# Patient Record
Sex: FEMALE | Race: BLACK OR AFRICAN AMERICAN | Employment: OTHER | URBAN - METROPOLITAN AREA
[De-identification: names, ages, dates, MRNs, and addresses within clinical notes are randomized per-mention and may not be internally consistent; named-entity substitution may affect disease eponyms.]

---

## 2019-09-05 ENCOUNTER — OFFICE VISIT (OUTPATIENT)
Dept: FAMILY MEDICINE CLINIC | Age: 79
End: 2019-09-05

## 2019-09-05 VITALS
HEIGHT: 65 IN | DIASTOLIC BLOOD PRESSURE: 84 MMHG | BODY MASS INDEX: 29.39 KG/M2 | WEIGHT: 176.4 LBS | OXYGEN SATURATION: 94 % | RESPIRATION RATE: 16 BRPM | HEART RATE: 65 BPM | TEMPERATURE: 97.8 F | SYSTOLIC BLOOD PRESSURE: 170 MMHG

## 2019-09-05 DIAGNOSIS — Z78.0 POST-MENOPAUSAL: ICD-10-CM

## 2019-09-05 DIAGNOSIS — E78.00 PURE HYPERCHOLESTEROLEMIA: ICD-10-CM

## 2019-09-05 DIAGNOSIS — I10 ESSENTIAL HYPERTENSION: Primary | ICD-10-CM

## 2019-09-05 RX ORDER — AMLODIPINE BESYLATE 5 MG/1
5 TABLET ORAL DAILY
COMMUNITY
End: 2019-09-05 | Stop reason: SDUPTHER

## 2019-09-05 RX ORDER — ENALAPRIL MALEATE 20 MG/1
20 TABLET ORAL DAILY
COMMUNITY
End: 2019-09-05 | Stop reason: SDUPTHER

## 2019-09-05 RX ORDER — CHOLECALCIFEROL TAB 125 MCG (5000 UNIT) 125 MCG
5000 TAB ORAL DAILY
COMMUNITY

## 2019-09-05 RX ORDER — ENALAPRIL MALEATE 20 MG/1
20 TABLET ORAL DAILY
Qty: 90 TAB | Refills: 0 | Status: SHIPPED | OUTPATIENT
Start: 2019-09-05 | End: 2019-10-22 | Stop reason: SDUPTHER

## 2019-09-05 RX ORDER — AMLODIPINE BESYLATE 5 MG/1
5 TABLET ORAL DAILY
Qty: 90 TAB | Refills: 0 | Status: SHIPPED | OUTPATIENT
Start: 2019-09-05 | End: 2020-01-08 | Stop reason: SDUPTHER

## 2019-09-05 RX ORDER — BISMUTH SUBSALICYLATE 262 MG
1 TABLET,CHEWABLE ORAL DAILY
COMMUNITY

## 2019-09-05 NOTE — PATIENT INSTRUCTIONS
DASH Diet: Care Instructions  Your Care Instructions    The DASH diet is an eating plan that can help lower your blood pressure. DASH stands for Dietary Approaches to Stop Hypertension. Hypertension is high blood pressure. The DASH diet focuses on eating foods that are high in calcium, potassium, and magnesium. These nutrients can lower blood pressure. The foods that are highest in these nutrients are fruits, vegetables, low-fat dairy products, nuts, seeds, and legumes. But taking calcium, potassium, and magnesium supplements instead of eating foods that are high in those nutrients does not have the same effect. The DASH diet also includes whole grains, fish, and poultry. The DASH diet is one of several lifestyle changes your doctor may recommend to lower your high blood pressure. Your doctor may also want you to decrease the amount of sodium in your diet. Lowering sodium while following the DASH diet can lower blood pressure even further than just the DASH diet alone. Follow-up care is a key part of your treatment and safety. Be sure to make and go to all appointments, and call your doctor if you are having problems. It's also a good idea to know your test results and keep a list of the medicines you take. How can you care for yourself at home? Following the DASH diet  · Eat 4 to 5 servings of fruit each day. A serving is 1 medium-sized piece of fruit, ½ cup chopped or canned fruit, 1/4 cup dried fruit, or 4 ounces (½ cup) of fruit juice. Choose fruit more often than fruit juice. · Eat 4 to 5 servings of vegetables each day. A serving is 1 cup of lettuce or raw leafy vegetables, ½ cup of chopped or cooked vegetables, or 4 ounces (½ cup) of vegetable juice. Choose vegetables more often than vegetable juice. · Get 2 to 3 servings of low-fat and fat-free dairy each day. A serving is 8 ounces of milk, 1 cup of yogurt, or 1 ½ ounces of cheese. · Eat 6 to 8 servings of grains each day.  A serving is 1 slice of bread, 1 ounce of dry cereal, or ½ cup of cooked rice, pasta, or cooked cereal. Try to choose whole-grain products as much as possible. · Limit lean meat, poultry, and fish to 2 servings each day. A serving is 3 ounces, about the size of a deck of cards. · Eat 4 to 5 servings of nuts, seeds, and legumes (cooked dried beans, lentils, and split peas) each week. A serving is 1/3 cup of nuts, 2 tablespoons of seeds, or ½ cup of cooked beans or peas. · Limit fats and oils to 2 to 3 servings each day. A serving is 1 teaspoon of vegetable oil or 2 tablespoons of salad dressing. · Limit sweets and added sugars to 5 servings or less a week. A serving is 1 tablespoon jelly or jam, ½ cup sorbet, or 1 cup of lemonade. · Eat less than 2,300 milligrams (mg) of sodium a day. If you limit your sodium to 1,500 mg a day, you can lower your blood pressure even more. Tips for success  · Start small. Do not try to make dramatic changes to your diet all at once. You might feel that you are missing out on your favorite foods and then be more likely to not follow the plan. Make small changes, and stick with them. Once those changes become habit, add a few more changes. · Try some of the following:  ? Make it a goal to eat a fruit or vegetable at every meal and at snacks. This will make it easy to get the recommended amount of fruits and vegetables each day. ? Try yogurt topped with fruit and nuts for a snack or healthy dessert. ? Add lettuce, tomato, cucumber, and onion to sandwiches. ? Combine a ready-made pizza crust with low-fat mozzarella cheese and lots of vegetable toppings. Try using tomatoes, squash, spinach, broccoli, carrots, cauliflower, and onions. ? Have a variety of cut-up vegetables with a low-fat dip as an appetizer instead of chips and dip. ? Sprinkle sunflower seeds or chopped almonds over salads. Or try adding chopped walnuts or almonds to cooked vegetables.   ? Try some vegetarian meals using beans and peas. Add garbanzo or kidney beans to salads. Make burritos and tacos with mashed rockwell beans or black beans. Where can you learn more? Go to http://obinna-yoselin.info/. Enter U607 in the search box to learn more about \"DASH Diet: Care Instructions. \"  Current as of: July 22, 2018  Content Version: 12.1  © 6435-4525 [employer], [employer]. Care instructions adapted under license by [employer] (which disclaims liability or warranty for this information). If you have questions about a medical condition or this instruction, always ask your healthcare professional. [address] any warranty or liability for your use of this information.

## 2019-09-05 NOTE — PROGRESS NOTES
1. Have you been to the ER, urgent care clinic since your last visit? Hospitalized since your last visit? No    2. Have you seen or consulted any other health care providers outside of the [address] [place] [address] since your last visit? Include any pap smears or colon screening. No    Chief Complaint   Patient presents with    New Patient    Hypertension    Other     balance problems due to eye condition - previous bilat cataract extraction with lens implant    Medication Refill    Cholesterol Problem       Patient stated she was not taking blood pressure medications daily for the last month - concerned about running out of medication.

## 2019-09-05 NOTE — PROGRESS NOTES
Freida Severe, [de-identified] y.o.,  female    [address] [facility] [place], relocated from [place]    HTN- long standing h/o. On enalapril and norvasc. She says usually doctor visit checks /80's. 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 ROS:  See HPI, all others negative        There is no problem list on file for this patient. Current Outpatient Medications   Medication Sig Dispense Refill    multivitamin (ONE A DAY) tablet Take 1 Tab by mouth daily.  cholecalciferol, VITAMIN D3, (VITAMIN D3) 5,000 unit tab tablet Take 5,000 Units by mouth daily.  amLODIPine (NORVASC) 5 mg tablet Take 1 Tab by mouth daily. 90 Tab 0    enalapril (VASOTEC) 20 mg tablet Take 1 Tab by mouth daily.  90 Tab 0       No Known Allergies    Past Medical History:   Diagnosis Date    High blood pressure     High cholesterol     Migraine        Social History     Socioeconomic History    Marital status:      Spouse name: Not on file    Number of children: Not on file    Years of education: Not on file    Highest education level: Not on file   Occupational History    Not on file   Social Needs    Financial resource strain: Not on file    Food insecurity:     Worry: Not on file     Inability: Not on file    Transportation needs:     Medical: Not on file     Non-medical: Not on file   Tobacco Use    Smoking status: Former Smoker     Types: Cigarettes     Last attempt to quit: 1979     Years since quittin.7    Smokeless tobacco: Never Used   Substance and Sexual Activity    Alcohol use: Yes     Frequency: Monthly or less     Drinks per session: 1 or 2     Binge frequency: Never    Drug use: Never    Sexual activity: Not Currently     Partners: Male     Birth control/protection: Surgical     Comment: tubal ligation   Lifestyle  Physical activity:     Days per week: Not on file     Minutes per session: Not on file    Stress: Not on file   Relationships    Social connections:     Talks on phone: Not on file     Gets together: Not on file     Attends Christianity service: Not on file     Active member of club or organization: Not on file     Attends meetings of clubs or organizations: Not on file     Relationship status: Not on file    Intimate partner violence:     Fear of current or ex partner: Not on file     Emotionally abused: Not on file     Physically abused: Not on file     Forced sexual activity: Not on file   Other Topics Concern    Not on file   Social History Narrative    Not on file       Family History   Problem Relation Age of Onset    Cancer Mother         leukemia    Stroke Maternal Grandmother     Hypertension Maternal Grandmother     Substance Abuse Maternal Uncle         tobacco use    Cancer Maternal Uncle         leukemia         OBJECTIVE    Physical Exam:     Visit Vitals  /84 (BP 1 Location: Left arm, BP Patient Position: Sitting)   Pulse 65   Temp 97.8 °F (36.6 °C) (Oral)   Resp 16   Ht 5' 4.5\" (1.638 m)   Wt 176 lb 6.4 oz (80 kg)   SpO2 94%   BMI 29.81 kg/m²       General: alert, well-appearing, AA, in no apparent distress or pain  Head: atraumatic.  Non-tender maxillary and frontal sinuses  Eyes: Lids with no discharge, no matting, conjunctivae clear and non injected, full EOMs, PERLLA  Ears: pinna non-tender, external auditory canal patent, TM intact  Mouth/throat:tonsils non enlarged, pharynx non erythematous and no lesion, nasal mucosa  normal  Neck: supple, no adenopathy palpated  CVS: normal rate, regular rhythm, distinct S1 and S2  Lungs:clear to ausculation bilaterally, no crackles, wheezing or rhonchi noted  Abdomen: normoactive bowel sounds, soft, non-tender  Extremities: no edema, no cyanosis, MSK grossly normal  Skin: warm, no lesions, rashes noted  Psych:  mood and affect normal        ASSESSMENT/PLAN  Diagnoses and all orders for this visit:    1. Essential hypertension  Elevated today, per pt previously controlled  Cont arb, ccb for now  -     METABOLIC PANEL, COMPREHENSIVE; Future  -     LIPID PANEL; Future    2. Post-menopausal  -     DEXA BONE DENSITY STUDY AXIAL; Future    3. Pure hypercholesterolemia  H/o myalgia on unrecalled statin    4. BMI 29    Other orders  -     amLODIPine (NORVASC) 5 mg tablet; Take 1 Tab by mouth daily. -     enalapril (VASOTEC) 20 mg tablet; Take 1 Tab by mouth daily. Pt declines prev ca screening tests  Pt declines vaccines    Follow-up and Dispositions    · Return in about 2 weeks (around 9/19/2019), or if symptoms worsen or fail to improve, for routine chronic illness care. Patient understands plan of care. Patient has provided input and agrees with goals.

## 2019-09-18 ENCOUNTER — HOSPITAL ENCOUNTER (OUTPATIENT)
Dept: LAB | Age: 79
Discharge: HOME | End: 2019-09-18

## 2019-09-18 LAB — XX-LABCORP SPECIMEN COL,LCBCF: NORMAL

## 2019-09-18 PROCEDURE — 99001 SPECIMEN HANDLING PT-LAB: CPT

## 2019-09-19 LAB
ALBUMIN SERPL-MCNC: 4.3 G/DL (ref 3.5–4.7)
ALBUMIN/GLOB SERPL: 1.5 {RATIO} (ref 1.2–2.2)
ALP SERPL-CCNC: 89 IU/L (ref 39–117)
ALT SERPL-CCNC: 14 IU/L (ref 0–32)
AST SERPL-CCNC: 13 IU/L (ref 0–40)
BILIRUB SERPL-MCNC: 0.3 MG/DL (ref 0–1.2)
BUN SERPL-MCNC: 11 MG/DL (ref 8–27)
BUN/CREAT SERPL: 15 (ref 12–28)
CALCIUM SERPL-MCNC: 9.5 MG/DL (ref 8.7–10.3)
CHLORIDE SERPL-SCNC: 106 MMOL/L (ref 96–106)
CHOLEST SERPL-MCNC: 208 MG/DL (ref 100–199)
CO2 SERPL-SCNC: 21 MMOL/L (ref 20–29)
CREAT SERPL-MCNC: 0.71 MG/DL (ref 0.57–1)
GLOBULIN SER CALC-MCNC: 2.8 G/DL (ref 1.5–4.5)
GLUCOSE SERPL-MCNC: 104 MG/DL (ref 65–99)
HDLC SERPL-MCNC: 52 MG/DL
INTERPRETATION, 910389: NORMAL
LDLC SERPL CALC-MCNC: 127 MG/DL (ref 0–99)
POTASSIUM SERPL-SCNC: 4.4 MMOL/L (ref 3.5–5.2)
PROT SERPL-MCNC: 7.1 G/DL (ref 6–8.5)
SODIUM SERPL-SCNC: 142 MMOL/L (ref 134–144)
SPECIMEN STATUS REPORT, ROLRST: NORMAL
TRIGL SERPL-MCNC: 147 MG/DL (ref 0–149)
VLDLC SERPL CALC-MCNC: 29 MG/DL (ref 5–40)

## 2019-09-25 ENCOUNTER — OFFICE VISIT (OUTPATIENT)
Dept: FAMILY MEDICINE CLINIC | Age: 79
End: 2019-09-25

## 2019-09-25 VITALS
RESPIRATION RATE: 16 BRPM | HEIGHT: 65 IN | TEMPERATURE: 98.1 F | HEART RATE: 66 BPM | WEIGHT: 177.25 LBS | DIASTOLIC BLOOD PRESSURE: 74 MMHG | BODY MASS INDEX: 29.53 KG/M2 | SYSTOLIC BLOOD PRESSURE: 128 MMHG | OXYGEN SATURATION: 97 %

## 2019-09-25 DIAGNOSIS — Z71.89 ADVANCE CARE PLANNING: ICD-10-CM

## 2019-09-25 DIAGNOSIS — Z13.5 GLAUCOMA SCREENING: ICD-10-CM

## 2019-09-25 DIAGNOSIS — Z78.0 POST-MENOPAUSAL: ICD-10-CM

## 2019-09-25 DIAGNOSIS — I10 ESSENTIAL HYPERTENSION: Primary | ICD-10-CM

## 2019-09-25 DIAGNOSIS — E78.00 PURE HYPERCHOLESTEROLEMIA: ICD-10-CM

## 2019-09-25 DIAGNOSIS — Z00.00 MEDICARE ANNUAL WELLNESS VISIT, SUBSEQUENT: ICD-10-CM

## 2019-09-25 NOTE — PATIENT INSTRUCTIONS
DASH Diet: Care Instructions Your Care Instructions The DASH diet is an eating plan that can help lower your blood pressure. DASH stands for Dietary Approaches to Stop Hypertension. Hypertension is high blood pressure. The DASH diet focuses on eating foods that are high in calcium, potassium, and magnesium. These nutrients can lower blood pressure. The foods that are highest in these nutrients are fruits, vegetables, low-fat dairy products, nuts, seeds, and legumes. But taking calcium, potassium, and magnesium supplements instead of eating foods that are high in those nutrients does not have the same effect. The DASH diet also includes whole grains, fish, and poultry. The DASH diet is one of several lifestyle changes your doctor may recommend to lower your high blood pressure. Your doctor may also want you to decrease the amount of sodium in your diet. Lowering sodium while following the DASH diet can lower blood pressure even further than just the DASH diet alone. Follow-up care is a key part of your treatment and safety. Be sure to make and go to all appointments, and call your doctor if you are having problems. It's also a good idea to know your test results and keep a list of the medicines you take. How can you care for yourself at home? Following the DASH diet · Eat 4 to 5 servings of fruit each day. A serving is 1 medium-sized piece of fruit, ½ cup chopped or canned fruit, 1/4 cup dried fruit, or 4 ounces (½ cup) of fruit juice. Choose fruit more often than fruit juice. · Eat 4 to 5 servings of vegetables each day. A serving is 1 cup of lettuce or raw leafy vegetables, ½ cup of chopped or cooked vegetables, or 4 ounces (½ cup) of vegetable juice. Choose vegetables more often than vegetable juice. · Get 2 to 3 servings of low-fat and fat-free dairy each day. A serving is 8 ounces of milk, 1 cup of yogurt, or 1 ½ ounces of cheese. · Eat 6 to 8 servings of grains each day. A serving is 1 slice of bread, 1 ounce of dry cereal, or ½ cup of cooked rice, pasta, or cooked cereal. Try to choose whole-grain products as much as possible. · Limit lean meat, poultry, and fish to 2 servings each day. A serving is 3 ounces, about the size of a deck of cards. · Eat 4 to 5 servings of nuts, seeds, and legumes (cooked dried beans, lentils, and split peas) each week. A serving is 1/3 cup of nuts, 2 tablespoons of seeds, or ½ cup of cooked beans or peas. · Limit fats and oils to 2 to 3 servings each day. A serving is 1 teaspoon of vegetable oil or 2 tablespoons of salad dressing. · Limit sweets and added sugars to 5 servings or less a week. A serving is 1 tablespoon jelly or jam, ½ cup sorbet, or 1 cup of lemonade. · Eat less than 2,300 milligrams (mg) of sodium a day. If you limit your sodium to 1,500 mg a day, you can lower your blood pressure even more. Tips for success · Start small. Do not try to make dramatic changes to your diet all at once. You might feel that you are missing out on your favorite foods and then be more likely to not follow the plan. Make small changes, and stick with them. Once those changes become habit, add a few more changes. · Try some of the following: ? Make it a goal to eat a fruit or vegetable at every meal and at snacks. This will make it easy to get the recommended amount of fruits and vegetables each day. ? Try yogurt topped with fruit and nuts for a snack or healthy dessert. ? Add lettuce, tomato, cucumber, and onion to sandwiches. ? Combine a ready-made pizza crust with low-fat mozzarella cheese and lots of vegetable toppings. Try using tomatoes, squash, spinach, broccoli, carrots, cauliflower, and onions. ? Have a variety of cut-up vegetables with a low-fat dip as an appetizer instead of chips and dip. ? Sprinkle sunflower seeds or chopped almonds over salads.  Or try adding chopped walnuts or almonds to cooked vegetables. ? Try some vegetarian meals using beans and peas. Add garbanzo or kidney beans to salads. Make burritos and tacos with mashed rockwell beans or black beans. Where can you learn more? Go to http://obinna-yoselin.info/. Enter O349 in the search box to learn more about \"DASH Diet: Care Instructions. \" Current as of: April 9, 2019 Content Version: 12.2 © 0477-5124 [employer]. Care instructions adapted under license by [employer] (which disclaims liability or warranty for this information). If you have questions about a medical condition or this instruction, always ask your healthcare professional. [address] any warranty or liability for your use of this information. Medicare Wellness Visit, Female The best way to live healthy is to have a lifestyle where you eat a well-balanced diet, exercise regularly, limit alcohol use, and quit all forms of tobacco/nicotine, if applicable. Regular preventive services are another way to keep healthy. Preventive services (vaccines, screening tests, monitoring & exams) can help personalize your care plan, which helps you manage your own care. Screening tests can find health problems at the earliest stages, when they are easiest to treat. [facility] follows the current, evidence-based guidelines published by the [place] (USPSTF) when recommending preventive services for our patients. Because we follow these guidelines, sometimes recommendations change over time as research supports it. (For example, mammograms used to be recommended annually. Even though Medicare will still pay for an annual mammogram, the newer guidelines recommend a mammogram every two years for women of average risk.) Of course, you and your doctor may decide to screen more often for some diseases, based on your risk and your health status. Preventive services for you include: - Medicare offers their members a free annual wellness visit, which is time for you and your primary care provider to discuss and plan for your preventive service needs. Take advantage of this benefit every year! 
-All adults over the age of 72 should receive the recommended pneumonia vaccines. Current USPSTF guidelines recommend a series of two vaccines for the best pneumonia protection.  
-All adults should have a flu vaccine yearly and a tetanus vaccine every 10 years. All adults age 61 and older should receive a shingles vaccine once in their lifetime.   
-A bone mass density test is recommended when a woman turns 65 to screen for osteoporosis. This test is only recommended one time, as a screening. Some providers will use this same test as a disease monitoring tool if you already have osteoporosis. -All adults age 38-68 who are overweight should have a diabetes screening test once every three years.  
-Other screening tests and preventive services for persons with diabetes include: an eye exam to screen for diabetic retinopathy, a kidney function test, a foot exam, and stricter control over your cholesterol.  
-Cardiovascular screening for adults with routine risk involves an electrocardiogram (ECG) at intervals determined by your doctor.  
-Colorectal cancer screenings should be done for adults age 54-65 with no increased risk factors for colorectal cancer. There are a number of acceptable methods of screening for this type of cancer. Each test has its own benefits and drawbacks. Discuss with your doctor what is most appropriate for you during your annual wellness visit. The different tests include: colonoscopy (considered the best screening method), a fecal occult blood test, a fecal DNA test, and sigmoidoscopy. -Breast cancer screenings are recommended every other year for women of normal risk, age 54-69. -Cervical cancer screenings for women over age 72 are only recommended with certain risk factors.  
-All adults born between [facility] should be screened once for Hepatitis C. Here is a list of your current Health Maintenance items (your personalized list of preventive services) with a due date: 
Health Maintenance Due Topic Date Due  
 DTaP/Tdap/Td  (1 - Tdap) 05/22/1960  Shingles Vaccine (1 of 2) 05/22/1989  Glaucoma Screening   05/22/2004  Bone Mineral Density   05/22/2004 [address] [place] [address] Annual Well Visit  09/05/2019

## 2019-09-25 NOTE — PROGRESS NOTES
1. Have you been to the ER, urgent care clinic since your last visit? Hospitalized since your last visit? No    2. Have you seen or consulted any other health care providers outside of the [address] [place] [address] since your last visit? Include any pap smears or colon screening. No     Abuse Screening Questionnaire 9/25/2019   Do you ever feel afraid of your partner? N   Are you in a relationship with someone who physically or mentally threatens you? N   Is it safe for you to go home? Y     Fall Risk Assessment, last 12 mths 9/25/2019   Able to walk? Yes   Fall in past 12 months?  No             3 most recent PHQ Screens 9/25/2019   Little interest or pleasure in doing things Not at all   Feeling down, depressed, irritable, or hopeless Not at all   Total Score PHQ 2 0

## 2019-09-25 NOTE — PROGRESS NOTES
This is the Subsequent Medicare Annual Wellness Exam, performed 12 months or more after the Initial AWV or the last Subsequent AWV    I have reviewed the patient's medical history in detail and updated the computerized patient record. History     Past Medical History:   Diagnosis Date    High blood pressure     High cholesterol     Migraine       Past Surgical History:   Procedure Laterality Date    HX HEMORRHOIDECTOMY       Current Outpatient Medications   Medication Sig Dispense Refill    multivitamin (ONE A DAY) tablet Take 1 Tab by mouth daily.  cholecalciferol, VITAMIN D3, (VITAMIN D3) 5,000 unit tab tablet Take 5,000 Units by mouth daily.  amLODIPine (NORVASC) 5 mg tablet Take 1 Tab by mouth daily. 90 Tab 0    enalapril (VASOTEC) 20 mg tablet Take 1 Tab by mouth daily. [address] Tab 0     No Known Allergies  Family History   Problem Relation Age of Onset    Cancer Mother         leukemia    Stroke Maternal Grandmother     Hypertension Maternal Grandmother     Substance Abuse Maternal Uncle         tobacco use    Cancer Maternal Uncle         leukemia     Social History     Tobacco Use    Smoking status: Former Smoker     Types: Cigarettes     Last attempt to quit: 1979     Years since quittin.7    Smokeless tobacco: Never Used   Substance Use Topics    Alcohol use: Yes     Frequency: Monthly or less     Drinks per session: 1 or 2     Binge frequency: Never     There is no problem list on file for this patient. Depression Risk Factor Screening:     3 most recent PHQ Screens 2019   Little interest or pleasure in doing things Not at all   Feeling down, depressed, irritable, or hopeless Not at all   Total Score PHQ 2 0     Alcohol Risk Factor Screening: You do not drink alcohol or very rarely. Functional Ability and Level of Safety:   Hearing Loss  Hearing is good. Activities of Daily Living  The home contains: no safety equipment.   Patient does total self care    Fall Risk  Fall Risk Assessment, last 12 mths 9/25/2019   Able to walk? Yes   Fall in past 12 months? No       Abuse Screen  Patient is not abused    Cognitive Screening   Evaluation of Cognitive Function:  Has your family/caregiver stated any concerns about your memory: no      Patient Care Team   Patient Care Team:  Ender Perdue MD as PCP - General (Family Practice)    Assessment/Plan   Education and counseling provided:  Are appropriate based on today's review and evaluation  End-of-Life planning (with patient's consent)-discussed, provided form  Pneumococcal Vaccine-pt declines  Influenza Vaccine- pt declines  Bone mass measurement (DEXA)- due, previously ordered  Screening for glaucoma-due, referral placed      Diagnoses and all orders for this visit:    1. Advance care planning  -     ADVANCE CARE PLANNING FIRST 30 MINS    2. Glaucoma screening  -     REFERRAL TO OPHTHALMOLOGY    3. Medicare annual wellness visit, subsequent        Health Maintenance Due   Topic Date Due    DTaP/Tdap/Td series (1 - Tdap) 05/22/1960    Shingrix Vaccine Age 50> (1 of 2) 05/22/1989    GLAUCOMA SCREENING Q2Y  05/22/2004    Bone Densitometry (Dexa) Screening  05/22/2004    MEDICARE YEARLY EXAM  09/05/2019     Luisa An, [de-identified] y.o.,  female    SUBJECTIVE  Ff-up, 2nd visit    HTN- long standing h/o. On enalapril and norvasc. She says usually doctor visit checks /80's. She does not check BP at home  HL- reports to have been prescribed unrecalled statin in the past causing muscle aches. 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 ROS:  See HPI, all others negative        There is no problem list on file for this patient. Current Outpatient Medications   Medication Sig Dispense Refill    multivitamin (ONE A DAY) tablet Take 1 Tab by mouth daily.       cholecalciferol, VITAMIN D3, (VITAMIN D3) 5,000 unit tab tablet Take 5,000 Units by mouth daily.  amLODIPine (NORVASC) 5 mg tablet Take 1 Tab by mouth daily. 90 Tab 0    enalapril (VASOTEC) 20 mg tablet Take 1 Tab by mouth daily.  90 Tab 0       No Known Allergies    Past Medical History:   Diagnosis Date    High blood pressure     High cholesterol     Migraine        Social History     Socioeconomic History    Marital status:      Spouse name: Not on file    Number of children: Not on file    Years of education: Not on file    Highest education level: Not on file   Occupational History    Not on file   Social Needs    Financial resource strain: Not on file    Food insecurity:     Worry: Not on file     Inability: Not on file    Transportation needs:     Medical: Not on file     Non-medical: Not on file   Tobacco Use    Smoking status: Former Smoker     Types: Cigarettes     Last attempt to quit: 1979     Years since quittin.7    Smokeless tobacco: Never Used   Substance and Sexual Activity    Alcohol use: Yes     Frequency: Monthly or less     Drinks per session: 1 or 2     Binge frequency: Never    Drug use: Never    Sexual activity: Not Currently     Partners: Male     Birth control/protection: Surgical     Comment: tubal ligation   Lifestyle    Physical activity:     Days per week: Not on file     Minutes per session: Not on file    Stress: Not on file   Relationships    Social connections:     Talks on phone: Not on file     Gets together: Not on file     Attends [employer]ism service: Not on file     Active member of club or organization: Not on file     Attends meetings of clubs or organizations: Not on file     Relationship status: Not on file    Intimate partner violence:     Fear of current or ex partner: Not on file     Emotionally abused: Not on file     Physically abused: Not on file     Forced sexual activity: Not on file   Other Topics Concern    Not on file   Social History Narrative    Not on file       Family History   Problem Relation Age of Onset    Cancer Mother         leukemia    Stroke Maternal Grandmother     Hypertension Maternal Grandmother     Substance Abuse Maternal Uncle         tobacco use    Cancer Maternal Uncle         leukemia         OBJECTIVE    Physical Exam:     Visit Vitals  /74 (BP 1 Location: Left arm, BP Patient Position: Sitting)   Pulse 66   Temp 98.1 °F (36.7 °C) (Oral)   Resp 16   Ht 5' 4.5\" (1.638 m)   Wt 177 lb 4 oz (80.4 kg)   SpO2 97%   BMI 29.96 kg/m²       General: alert, well-appearing, AA, in no apparent distress or pain  Head: atraumatic. Non-tender maxillary and frontal sinuses  Eyes: Lids with no discharge, no matting, conjunctivae clear and non injected, full EOMs, PERLLA  Ears: pinna non-tender, external auditory canal patent, TM intact  Mouth/throat:tonsils non enlarged, pharynx non erythematous and no lesion, nasal mucosa  normal  Neck: supple, no adenopathy palpated  CVS: normal rate, regular rhythm, distinct S1 and S2  Lungs:clear to ausculation bilaterally, no crackles, wheezing or rhonchi noted  Abdomen: normoactive bowel sounds, soft, non-tender  Extremities: no edema, no cyanosis, MSK grossly normal  Skin: warm, no lesions, rashes noted  Psych:  mood and affect normal        ASSESSMENT/PLAN  Diagnoses and all orders for this visit:    1. Essential hypertension  controlled  Cont arb, ccb for now    2. Post-menopausal  -     DEXA BONE DENSITY STUDY AXIAL; Future    3. Pure hypercholesterolemia  H/o myalgia on unrecalled statin  For her age and lipid level, we will merely cont healthy lifestyle/diet      Other orders  -     amLODIPine (NORVASC) 5 mg tablet; Take 1 Tab by mouth daily. -     enalapril (VASOTEC) 20 mg tablet; Take 1 Tab by mouth daily. Pt declines prev ca screening tests  Pt declines vaccines    Follow-up and Dispositions    · Return in about 6 months (around 3/25/2020), or if symptoms worsen or fail to improve, for routine chronic illness care.        Patient understands plan of care. Patient has provided input and agrees with goals.

## 2019-09-25 NOTE — ACP (ADVANCE CARE PLANNING)
Advance Care Planning (ACP) Provider Note - Comprehensive     Date of ACP Conversation: 09/25/19  Persons included in Conversation:  patient  Length of ACP Conversation in minutes:  16 minutes    Authorized Decision Maker (if patient is incapable of making informed decisions): This person is:  has yet to delegate            General ACP for ALL Patients with Decision Making Capacity:   Importance of advance care planning, including choosing a healthcare agent to communicate patient's healthcare decisions if patient lost the ability to make decisions, such as after a sudden illness or accident  Understanding of the healthcare agent role was assessed and information provided  Exploration of values, goals, and preferences if recovery is not expected, even with continued medical treatment in the event of: Imminent death  Severe, permanent brain injury  \"In these circumstances, what matters most to you? \"  Other: still considering        Interventions Provided:  Recommended completion of Advance Directive form after review of ACP materials and conversation with prospective healthcare agent   Recommended communicating the plan and making copies for the healthcare agent, personal physician, and others as appropriate (e.g., health system)  Recommended review of completed ACP document annually or upon change in health status

## 2019-11-06 ENCOUNTER — HOSPITAL ENCOUNTER (OUTPATIENT)
Dept: BONE DENSITY | Age: 79
Discharge: HOME | End: 2019-11-06
Attending: FAMILY MEDICINE
Payer: MEDICARE

## 2019-11-06 DIAGNOSIS — Z78.0 POST-MENOPAUSAL: ICD-10-CM

## 2019-11-06 PROCEDURE — 77080 DXA BONE DENSITY AXIAL: CPT

## 2019-11-11 DIAGNOSIS — M81.0 OSTEOPOROSIS, UNSPECIFIED OSTEOPOROSIS TYPE, UNSPECIFIED PATHOLOGICAL FRACTURE PRESENCE: Primary | ICD-10-CM

## 2019-11-11 NOTE — PROGRESS NOTES
+ osteoporosis on DEXA scan, higher risk for fractures  Advise referral to rheumatologist for further evaluation  pls notify pt, referral placed.

## 2019-11-14 NOTE — PROGRESS NOTES
Patient identified with 2 identifiers (name and ).   Patient aware of   + osteoporosis on DEXA scan, higher risk for fractures   Advise referral to rheumatologist for further evaluation

## 2020-01-08 NOTE — TELEPHONE ENCOUNTER
This patient contacted office for the following prescriptions to be filled:    Medication requested :   Requested Prescriptions     Pending Prescriptions Disp Refills    amLODIPine (NORVASC) 5 mg tablet 90 Tab 0     Sig: Take 1 Tab by mouth daily.      PCP: [facility] or Print: [facility]   Mail order or Local [facility]      Scheduled appointment if not seen by current providers in office:  [address] [place] [address] 9/25/2019 f/u 38/27/2020 pt only has 1 pill left

## 2020-01-09 RX ORDER — AMLODIPINE BESYLATE 5 MG/1
5 TABLET ORAL DAILY
Qty: 90 TAB | Refills: 2 | Status: SHIPPED | OUTPATIENT
Start: 2020-01-09 | End: 2020-08-18 | Stop reason: SDUPTHER

## 2020-08-18 ENCOUNTER — OFFICE VISIT (OUTPATIENT)
Dept: FAMILY MEDICINE CLINIC | Age: 80
End: 2020-08-18

## 2020-08-18 VITALS
RESPIRATION RATE: 16 BRPM | DIASTOLIC BLOOD PRESSURE: 82 MMHG | BODY MASS INDEX: 28.82 KG/M2 | SYSTOLIC BLOOD PRESSURE: 148 MMHG | HEIGHT: 65 IN | TEMPERATURE: 98.1 F | OXYGEN SATURATION: 98 % | WEIGHT: 173 LBS | HEART RATE: 84 BPM

## 2020-08-18 DIAGNOSIS — I10 ESSENTIAL HYPERTENSION: Primary | ICD-10-CM

## 2020-08-18 DIAGNOSIS — E78.00 PURE HYPERCHOLESTEROLEMIA: ICD-10-CM

## 2020-08-18 DIAGNOSIS — M81.0 OSTEOPOROSIS, UNSPECIFIED OSTEOPOROSIS TYPE, UNSPECIFIED PATHOLOGICAL FRACTURE PRESENCE: ICD-10-CM

## 2020-08-18 RX ORDER — ENALAPRIL MALEATE 20 MG/1
20 TABLET ORAL DAILY
Qty: 90 TAB | Refills: 1 | Status: SHIPPED | OUTPATIENT
Start: 2020-08-18 | End: 2020-10-14 | Stop reason: SDUPTHER

## 2020-08-18 RX ORDER — AMLODIPINE BESYLATE 5 MG/1
5 TABLET ORAL DAILY
Qty: 90 TAB | Refills: 1 | Status: SHIPPED | OUTPATIENT
Start: 2020-08-18 | End: 2020-10-06 | Stop reason: SDUPTHER

## 2020-08-18 NOTE — PROGRESS NOTES
Toni Brothers, [de-identified] y.o.,  female    SUBJECTIVE  Ff-upt    HTN- long standing h/o. On enalapril and norvasc. She does not check BP at home  HL- reports to have been prescribed unrecalled statin in the past causing muscle aches. Osteoporosis- did not pursue rheum eval. She denies fractures. 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 Current Outpatient Medications   Medication Sig Dispense Refill    multivitamin (ONE A DAY) tablet Take 1 Tab by mouth daily.  cholecalciferol, VITAMIN D3, (VITAMIN D3) 5,000 unit tab tablet Take 5,000 Units by mouth daily.  amLODIPine (NORVASC) 5 mg tablet Take 1 Tab by mouth daily. 90 Tab 0    enalapril (VASOTEC) 20 mg tablet Take 1 Tab by mouth daily.  90 Tab 0       No Known Allergies    Past Medical History:   Diagnosis Date    High blood pressure     High cholesterol     Migraine        Social History     Socioeconomic History    Marital status:      Spouse name: Not on file    Number of children: Not on file    Years of education: Not on file    Highest education level: Not on file   Occupational History    Not on file   Social Needs    Financial resource strain: Not on file    Food insecurity:     Worry: Not on file     Inability: Not on file    Transportation needs:     Medical: Not on file     Non-medical: Not on file   Tobacco Use    Smoking status: Former Smoker     Types: Cigarettes     Last attempt to quit: 1979     Years since quittin.7    Smokeless tobacco: Never Used   Substance and Sexual Activity    Alcohol use: Yes     Frequency: Monthly or less     Drinks per session: 1 or 2     Binge frequency: Never    Drug use: Never    Sexual activity: Not Currently     Partners: Male     Birth control/protection: Surgical     Comment: tubal ligation Lifestyle    Physical activity:     Days per week: Not on file     Minutes per session: Not on file    Stress: Not on file   Relationships    Social connections:     Talks on phone: Not on file     Gets together: Not on file     Attends Faith service: Not on file     Active member of club or organization: Not on file     Attends meetings of clubs or organizations: Not on file     Relationship status: Not on file    Intimate partner violence:     Fear of current or ex partner: Not on file     Emotionally abused: Not on file     Physically abused: Not on file     Forced sexual activity: Not on file   Other Topics Concern    Not on file   Social History Narrative    Not on file       Family History   Problem Relation Age of Onset    Cancer Mother         leukemia    Stroke Maternal Grandmother     Hypertension Maternal Grandmother     Substance Abuse Maternal Uncle         tobacco use    Cancer Maternal Uncle         leukemia         OBJECTIVE    Physical Exam:     Visit Vitals  /74 (BP 1 Location: Left arm, BP Patient Position: Sitting)   Pulse 66   Temp 98.1 °F (36.7 °C) (Oral)   Resp 16   Ht 5' 4.5\" (1.638 m)   Wt 177 lb 4 oz (80.4 kg)   SpO2 97%   BMI 29.96 kg/m²       General: alert, well-appearing, AA, in no apparent distress or pain  Head: atraumatic. Non-tender maxillary and frontal sinuses  Eyes: Lids with no discharge, no matting, conjunctivae clear and non injected, full EOMs, PERLLA  Ears: pinna non-tender, external auditory canal patent, TM intact  Neck: supple, no adenopathy palpated  CVS: normal rate, regular rhythm, distinct S1 and S2  Lungs:clear to ausculation bilaterally, no crackles, wheezing or rhonchi noted  Abdomen: normoactive bowel sounds, soft, non-tender  Extremities: no edema, no cyanosis, MSK grossly normal  Skin: warm, no lesions, rashes noted  Psych:  mood and affect normal        ASSESSMENT/PLAN  Diagnoses and all orders for this visit:    1.  Essential hypertension  controlled  Cont arb, ccb   Check cmp, lipid panel    2. Osteoporosis, unspecified osteoporosis type, unspecified pathologic fracture presence  Declines treatment  Cont ca/vit d/weight bearing exercises  Check vitamin D level    3. Pure hypercholesterolemia  H/o myalgia on unrecalled statin    Other orders  -     amLODIPine (NORVASC) 5 mg tablet; Take 1 Tab by mouth daily. -     enalapril (VASOTEC) 20 mg tablet; Take 1 Tab by mouth daily. · Ff-up in 3 months or sooner prn, plan on MWV then    Patient understands plan of care. Patient has provided input and agrees with goals.

## 2020-08-18 NOTE — PATIENT INSTRUCTIONS
Medicare Wellness Visit, Female The best way to live healthy is to have a lifestyle where you eat a well-balanced diet, exercise regularly, limit alcohol use, and quit all forms of tobacco/nicotine, if applicable. Regular preventive services are another way to keep healthy. Preventive services (vaccines, screening tests, monitoring & exams) can help personalize your care plan, which helps you manage your own care. Screening tests can find health problems at the earliest stages, when they are easiest to treat. [facility] follows the current, evidence-based guidelines published by the [facility] Woodson ([facility]) when recommending preventive services for our patients. Because we follow these guidelines, sometimes recommendations change over time as research supports it. (For example, mammograms used to be recommended annually. Even though Medicare will still pay for an annual mammogram, the newer guidelines recommend a mammogram every two years for women of average risk). Of course, you and your doctor may decide to screen more often for some diseases, based on your risk and your co-morbidities (chronic disease you are already diagnosed with). Preventive services for you include: - Medicare offers their members a free annual wellness visit, which is time for you and your primary care provider to discuss and plan for your preventive service needs. Take advantage of this benefit every year! 
-All adults over the age of 72 should receive the recommended pneumonia vaccines. Current USPSTF guidelines recommend a series of two vaccines for the best pneumonia protection.  
-All adults should have a flu vaccine yearly and a tetanus vaccine every 10 years.  
-All adults age 48 and older should receive the shingles vaccines (series of two vaccines). -All adults age 38-68 who are overweight should have a diabetes screening test once every three years. -All adults born between [birth year] and 1966 should be screened once for Hepatitis C. 
-Other screening tests and preventive services for persons with diabetes include: an eye exam to screen for diabetic retinopathy, a kidney function test, a foot exam, and stricter control over your cholesterol.  
-Cardiovascular screening for adults with routine risk involves an electrocardiogram (ECG) at intervals determined by your doctor.  
-Colorectal cancer screenings should be done for adults age 54-65 with no increased risk factors for colorectal cancer. There are a number of acceptable methods of screening for this type of cancer. Each test has its own benefits and drawbacks. Discuss with your doctor what is most appropriate for you during your annual wellness visit. The different tests include: colonoscopy (considered the best screening method), a fecal occult blood test, a fecal DNA test, and sigmoidoscopy. 
 
-A bone mass density test is recommended when a woman turns 65 to screen for osteoporosis. This test is only recommended one time, as a screening. Some providers will use this same test as a disease monitoring tool if you already have osteoporosis. -Breast cancer screenings are recommended every other year for women of normal risk, age 54-69. 
-Cervical cancer screenings for women over age 72 are only recommended with certain risk factors. Here is a list of your current Health Maintenance items (your personalized list of preventive services) with a due date: 
Health Maintenance Due Topic Date Due  
 DTaP/Tdap/Td  (1 - Tdap) 05/22/1960  Shingles Vaccine (1 of 2) 05/22/1989  Glaucoma Screening   05/22/2004  Flu Vaccine  08/01/2020

## 2020-08-31 ENCOUNTER — TELEPHONE (OUTPATIENT)
Dept: FAMILY MEDICINE CLINIC | Age: 80
End: 2020-08-31

## 2020-08-31 DIAGNOSIS — I73.9 PAD (PERIPHERAL ARTERY DISEASE) (HCC): Primary | ICD-10-CM

## 2020-08-31 NOTE — TELEPHONE ENCOUNTER
Who ordered the test? I do not see it in my notes. Regardless would need to see vascular surgery. Referral placed.

## 2020-08-31 NOTE — TELEPHONE ENCOUNTER
cassandra called with pt's PAD results . She states that the left was normal and the right has moderate obstruction.  Please call Clarissa Richardson at [phone] if you have any questions

## 2020-09-03 NOTE — TELEPHONE ENCOUNTER
Diane Aguiar from [facility] states that pt didn't know that she was suppose to schedule and won't schedule till she knows why. Can you please call and talk to the pt.  Than you

## 2020-10-06 NOTE — TELEPHONE ENCOUNTER
This pharmacy faxed over request for the following prescriptions to be filled:    Medication requested :   Requested Prescriptions     Pending Prescriptions Disp Refills    amLODIPine (NORVASC) 5 mg tablet 90 Tab 1     Sig: Take 1 Tab by mouth daily.      PCP: [address] [facility] [address] or Print: [employer]   Mail order or Local pharmacy Mail Order     Scheduled appointment if not seen by current providers in office:  LOV 8/18/2020 f/u 11/19/2020

## 2020-10-08 RX ORDER — AMLODIPINE BESYLATE 5 MG/1
5 TABLET ORAL DAILY
Qty: 90 TAB | Refills: 1 | Status: SHIPPED | OUTPATIENT
Start: 2020-10-08 | End: 2021-05-20 | Stop reason: SDUPTHER

## 2020-10-14 NOTE — TELEPHONE ENCOUNTER
This pharmacy faxed over request for the following prescriptions to be filled:    Medication requested :   Requested Prescriptions     Pending Prescriptions Disp Refills    enalapril (VASOTEC) 20 mg tablet 90 Tab 1     Sig: Take 1 Tab by mouth daily.      PCP: [facility] or Print: [facility]   Mail order or Local pharmacy Mail Order     Scheduled appointment if not seen by current providers in office: LOV 8/18/2020 f/u 11/19/2020

## 2020-10-15 RX ORDER — ENALAPRIL MALEATE 20 MG/1
20 TABLET ORAL DAILY
Qty: 90 TAB | Refills: 1 | Status: SHIPPED | OUTPATIENT
Start: 2020-10-15 | End: 2021-05-20 | Stop reason: SDUPTHER

## 2020-11-13 ENCOUNTER — HOSPITAL ENCOUNTER (OUTPATIENT)
Dept: LAB | Age: 80
Discharge: HOME | End: 2020-11-13
Payer: MEDICARE

## 2020-11-13 DIAGNOSIS — E78.00 PURE HYPERCHOLESTEROLEMIA: ICD-10-CM

## 2020-11-13 DIAGNOSIS — M81.0 OSTEOPOROSIS, UNSPECIFIED OSTEOPOROSIS TYPE, UNSPECIFIED PATHOLOGICAL FRACTURE PRESENCE: ICD-10-CM

## 2020-11-13 DIAGNOSIS — I10 ESSENTIAL HYPERTENSION: ICD-10-CM

## 2020-11-13 LAB
25(OH)D3 SERPL-MCNC: 41.5 NG/ML (ref 30–100)
ALBUMIN SERPL-MCNC: 3.8 G/DL (ref 3.4–5)
ALBUMIN/GLOB SERPL: 1 {RATIO} (ref 0.8–1.7)
ALP SERPL-CCNC: 107 U/L (ref 45–117)
ALT SERPL-CCNC: 27 U/L (ref 13–56)
ANION GAP SERPL CALC-SCNC: 5 MMOL/L (ref 3–18)
AST SERPL-CCNC: 18 U/L (ref 10–38)
BILIRUB SERPL-MCNC: 0.5 MG/DL (ref 0.2–1)
BUN SERPL-MCNC: 10 MG/DL (ref 7–18)
BUN/CREAT SERPL: 13 (ref 12–20)
CALCIUM SERPL-MCNC: 9.7 MG/DL (ref 8.5–10.1)
CHLORIDE SERPL-SCNC: 108 MMOL/L (ref 100–111)
CHOLEST SERPL-MCNC: 251 MG/DL
CO2 SERPL-SCNC: 29 MMOL/L (ref 21–32)
CREAT SERPL-MCNC: 0.79 MG/DL (ref 0.6–1.3)
GLOBULIN SER CALC-MCNC: 3.8 G/DL (ref 2–4)
GLUCOSE SERPL-MCNC: 90 MG/DL (ref 74–99)
HDLC SERPL-MCNC: 52 MG/DL (ref 40–60)
HDLC SERPL: 4.8 {RATIO} (ref 0–5)
LDLC SERPL CALC-MCNC: 161.6 MG/DL (ref 0–100)
LIPID PROFILE,FLP: ABNORMAL
POTASSIUM SERPL-SCNC: 4.1 MMOL/L (ref 3.5–5.5)
PROT SERPL-MCNC: 7.6 G/DL (ref 6.4–8.2)
SODIUM SERPL-SCNC: 142 MMOL/L (ref 136–145)
TRIGL SERPL-MCNC: 187 MG/DL (ref ?–150)
VLDLC SERPL CALC-MCNC: 37.4 MG/DL

## 2020-11-13 PROCEDURE — 82306 VITAMIN D 25 HYDROXY: CPT

## 2020-11-13 PROCEDURE — 80061 LIPID PANEL: CPT

## 2020-11-13 PROCEDURE — 36415 COLL VENOUS BLD VENIPUNCTURE: CPT

## 2020-11-13 PROCEDURE — 80053 COMPREHEN METABOLIC PANEL: CPT

## 2020-11-19 ENCOUNTER — OFFICE VISIT (OUTPATIENT)
Dept: FAMILY MEDICINE CLINIC | Age: 80
End: 2020-11-19
Payer: MEDICARE

## 2020-11-19 VITALS
HEIGHT: 65 IN | OXYGEN SATURATION: 96 % | BODY MASS INDEX: 28.99 KG/M2 | SYSTOLIC BLOOD PRESSURE: 136 MMHG | WEIGHT: 174 LBS | HEART RATE: 77 BPM | DIASTOLIC BLOOD PRESSURE: 70 MMHG | TEMPERATURE: 98.3 F | RESPIRATION RATE: 16 BRPM

## 2020-11-19 DIAGNOSIS — I10 ESSENTIAL HYPERTENSION: Primary | ICD-10-CM

## 2020-11-19 DIAGNOSIS — M81.0 OSTEOPOROSIS, UNSPECIFIED OSTEOPOROSIS TYPE, UNSPECIFIED PATHOLOGICAL FRACTURE PRESENCE: ICD-10-CM

## 2020-11-19 DIAGNOSIS — Z71.89 ADVANCE CARE PLANNING: ICD-10-CM

## 2020-11-19 DIAGNOSIS — Z00.00 MEDICARE ANNUAL WELLNESS VISIT, SUBSEQUENT: ICD-10-CM

## 2020-11-19 DIAGNOSIS — E78.00 PURE HYPERCHOLESTEROLEMIA: ICD-10-CM

## 2020-11-19 PROCEDURE — G8427 DOCREV CUR MEDS BY ELIG CLIN: HCPCS | Performed by: FAMILY MEDICINE

## 2020-11-19 PROCEDURE — 1101F PT FALLS ASSESS-DOCD LE1/YR: CPT | Performed by: FAMILY MEDICINE

## 2020-11-19 PROCEDURE — G0439 PPPS, SUBSEQ VISIT: HCPCS | Performed by: FAMILY MEDICINE

## 2020-11-19 PROCEDURE — 99497 ADVNCD CARE PLAN 30 MIN: CPT | Performed by: FAMILY MEDICINE

## 2020-11-19 PROCEDURE — 99214 OFFICE O/P EST MOD 30 MIN: CPT | Performed by: FAMILY MEDICINE

## 2020-11-19 PROCEDURE — G8419 CALC BMI OUT NRM PARAM NOF/U: HCPCS | Performed by: FAMILY MEDICINE

## 2020-11-19 PROCEDURE — G8510 SCR DEP NEG, NO PLAN REQD: HCPCS | Performed by: FAMILY MEDICINE

## 2020-11-19 PROCEDURE — 1090F PRES/ABSN URINE INCON ASSESS: CPT | Performed by: FAMILY MEDICINE

## 2020-11-19 PROCEDURE — G8536 NO DOC ELDER MAL SCRN: HCPCS | Performed by: FAMILY MEDICINE

## 2020-11-19 NOTE — PROGRESS NOTES
1. Have you been to the ER, urgent care clinic since your last visit? Hospitalized since your last visit? No    2. Have you seen or consulted any other health care providers outside of the [address] [place] [address] since your last visit? Include any pap smears or colon screening. No    Chief Complaint   Patient presents with    Annual Wellness Visit    Hypertension    Osteoporosis    Cholesterol Problem     Patient declined flu vaccine.

## 2020-11-19 NOTE — PATIENT INSTRUCTIONS
A Healthy Heart: Care Instructions Your Care Instructions Coronary artery disease, also called heart disease, occurs when a substance called plaque builds up in the vessels that supply oxygen-rich blood to your heart muscle. This can narrow the blood vessels and reduce blood flow. A heart attack happens when blood flow is completely blocked. A high-fat diet, smoking, and other factors increase the risk of heart disease. Your doctor has found that you have a chance of having heart disease. You can do lots of things to keep your heart healthy. It may not be easy, but you can change your diet, exercise more, and quit smoking. These steps really work to lower your chance of heart disease. Follow-up care is a key part of your treatment and safety. Be sure to make and go to all appointments, and call your doctor if you are having problems. It's also a good idea to know your test results and keep a list of the medicines you take. How can you care for yourself at home? Diet 
  · Use less salt when you cook and eat. This helps lower your blood pressure. Taste food before salting. Add only a little salt when you think you need it. With time, your taste buds will adjust to less salt.  
  · Eat fewer snack items, fast foods, canned soups, and other high-salt, high-fat, processed foods.  
  · Read food labels and try to avoid saturated and trans fats. They increase your risk of heart disease by raising cholesterol levels.  
  · Limit the amount of solid fat-butter, margarine, and shortening-you eat. Use olive, peanut, or canola oil when you cook. Bake, broil, and steam foods instead of frying them.  
  · Eat a variety of fruit and vegetables every day. Dark green, deep orange, red, or yellow fruits and vegetables are especially good for you.  Examples include spinach, carrots, peaches, and berries.  
  · Foods high in fiber can reduce your cholesterol and provide important vitamins and minerals. High-fiber foods include whole-grain cereals and breads, oatmeal, beans, brown rice, citrus fruits, and apples.  
  · Eat lean proteins. Heart-healthy proteins include seafood, lean meats and poultry, eggs, beans, peas, nuts, seeds, and soy products.  
  · Limit drinks and foods with added sugar. These include candy, desserts, and soda pop. Lifestyle changes 
  · If your doctor recommends it, get more exercise. Walking is a good choice. Bit by bit, increase the amount you walk every day. Try for at least 30 minutes on most days of the week. You also may want to swim, bike, or do other activities.  
  · Do not smoke. If you need help quitting, talk to your doctor about stop-smoking programs and medicines. These can increase your chances of quitting for good. Quitting smoking may be the most important step you can take to protect your heart. It is never too late to quit.  
  · Limit alcohol to 2 drinks a day for men and 1 drink a day for women. Too much alcohol can cause health problems.  
  · Manage other health problems such as diabetes, high blood pressure, and high cholesterol. If you think you may have a problem with alcohol or drug use, talk to your doctor. Medicines 
  · Take your medicines exactly as prescribed. Call your doctor if you think you are having a problem with your medicine.  
  · If your doctor recommends aspirin, take the amount directed each day. Make sure you take aspirin and not another kind of pain reliever, such as acetaminophen (Tylenol). When should you call for help? Call 911 if you have symptoms of a heart attack. These may include: 
  · Chest pain or pressure, or a strange feeling in the chest.  
  · Sweating.  
  · Shortness of breath.  
  · Pain, pressure, or a strange feeling in the back, neck, jaw, or upper belly or in one or both shoulders or arms.  
  · Lightheadedness or sudden weakness.  
  · A fast or irregular heartbeat. After you call 911, the  may tell you to chew 1 adult-strength or 2 to 4 low-dose aspirin. Wait for an ambulance. Do not try to drive yourself. Watch closely for changes in your health, and be sure to contact your doctor if you have any problems. Where can you learn more? Go to http://www.gray.com/ Enter C311 in the search box to learn more about \"A Healthy Heart: Care Instructions. \" Current as of: December 16, 2019               Content Version: 12.6 © 5426-1790 [employer]. Care instructions adapted under license by [employer] (which disclaims liability or warranty for this information). If you have questions about a medical condition or this instruction, always ask your healthcare professional. [address] any warranty or liability for your use of this information. Medicare Wellness Visit, Female The best way to live healthy is to have a lifestyle where you eat a well-balanced diet, exercise regularly, limit alcohol use, and quit all forms of tobacco/nicotine, if applicable. Regular preventive services are another way to keep healthy. Preventive services (vaccines, screening tests, monitoring & exams) can help personalize your care plan, which helps you manage your own care. Screening tests can find health problems at the earliest stages, when they are easiest to treat. [facility] follows the current, evidence-based guidelines published by the [place] (USPSTF) when recommending preventive services for our patients. Because we follow these guidelines, sometimes recommendations change over time as research supports it. (For example, mammograms used to be recommended annually.  Even though Medicare will still pay for an annual mammogram, the newer guidelines recommend a mammogram every two years for women of average risk). Of course, you and your doctor may decide to screen more often for some diseases, based on your risk and your co-morbidities (chronic disease you are already diagnosed with). Preventive services for you include: - Medicare offers their members a free annual wellness visit, which is time for you and your primary care provider to discuss and plan for your preventive service needs. Take advantage of this benefit every year! 
-All adults over the age of 72 should receive the recommended pneumonia vaccines. Current USPSTF guidelines recommend a series of two vaccines for the best pneumonia protection.  
-All adults should have a flu vaccine yearly and a tetanus vaccine every 10 years.  
-All adults age 48 and older should receive the shingles vaccines (series of two vaccines). -All adults age 38-68 who are overweight should have a diabetes screening test once every three years.  
-All adults born between [birth year] and 1965 should be screened once for Hepatitis C. 
-Other screening tests and preventive services for persons with diabetes include: an eye exam to screen for diabetic retinopathy, a kidney function test, a foot exam, and stricter control over your cholesterol.  
-Cardiovascular screening for adults with routine risk involves an electrocardiogram (ECG) at intervals determined by your doctor.  
-Colorectal cancer screenings should be done for adults age 54-65 with no increased risk factors for colorectal cancer. There are a number of acceptable methods of screening for this type of cancer. Each test has its own benefits and drawbacks. Discuss with your doctor what is most appropriate for you during your annual wellness visit.  The different tests include: colonoscopy (considered the best screening method), a fecal occult blood test, a fecal DNA test, and sigmoidoscopy. 
 
-A bone mass density test is recommended when a woman turns 65 to screen for osteoporosis. This test is only recommended one time, as a screening. Some providers will use this same test as a disease monitoring tool if you already have osteoporosis. -Breast cancer screenings are recommended every other year for women of normal risk, age 54-69. 
-Cervical cancer screenings for women over age 72 are only recommended with certain risk factors. Here is a list of your current Health Maintenance items (your personalized list of preventive services) with a due date: 
Health Maintenance Due Topic Date Due  
 DTaP/Tdap/Td  (1 - Tdap) 05/22/1960  Shingles Vaccine (1 of 2) 05/22/1989  Glaucoma Screening   05/22/2004  Pneumococcal Vaccine (1 of 1 - PPSV23) 05/22/2004  Yearly Flu Vaccine (1) 09/01/2020 [address] [place] [address] Annual Well Visit  09/25/2020

## 2020-11-19 NOTE — PROGRESS NOTES
This is the Subsequent Medicare Annual Wellness Exam, performed 12 months or more after the Initial AWV or the last Subsequent AWV    I have reviewed the patient's medical history in detail and updated the computerized patient record. Depression Risk Factor Screening:     3 most recent PHQ Screens 11/19/2020   Little interest or pleasure in doing things Not at all   Feeling down, depressed, irritable, or hopeless Not at all   Total Score PHQ 2 0       Alcohol Risk Screen   Do you average more than 1 drink per night or more than 7 drinks a week:  No    On any one occasion in the past three months have you have had more than 3 drinks containing alcohol:  No        Functional Ability and Level of Safety:   Hearing: Hearing is good. Activities of Daily Living: The home contains: no safety equipment. Patient does total self care     Ambulation: with no difficulty     Fall Risk:  Fall Risk Assessment, last 12 mths 11/19/2020   Able to walk? Yes   Fall in past 12 months?  No     Abuse Screen:  Patient is not abused       Cognitive Screening   Has your family/caregiver stated any concerns about your memory: no         Assessment/Plan   Education and counseling provided:  Are appropriate based on today's review and evaluation  End-of-Life planning (with patient's consent)- discussed, referred to ACP specialist  Pneumococcal Vaccine- pt declines  Influenza Vaccine-pt declines  Bone mass measurement (DEXA) 2019, declines treatment  Diabetes screening test 11/2020      Health Maintenance Due     Health Maintenance Due   Topic Date Due    DTaP/Tdap/Td series (1 - Tdap) 05/22/1960    Shingrix Vaccine Age 50> (1 of 2) 05/22/1989    GLAUCOMA SCREENING Q2Y  05/22/2004    Pneumococcal 65+ years (1 of 1 - PPSV23) 05/22/2004    Flu Vaccine (1) 09/01/2020    Medicare Yearly Exam  09/25/2020       Patient Care Team   Patient Care Team:  Errol Ramirez MD as PCP - General (Family Medicine)  Errol Ramirez MD as PCP - [facility]    History     Patient Active Problem List   Diagnosis Code    Osteoporosis M81.0    Essential hypertension I10    Pure hypercholesterolemia E78.00     Past Medical History:   Diagnosis Date    High blood pressure     High cholesterol     Migraine       Past Surgical History:   Procedure Laterality Date    HX HEMORRHOIDECTOMY       Current Outpatient Medications   Medication Sig Dispense Refill    enalapril (VASOTEC) 20 mg tablet Take 1 Tab by mouth daily. 90 Tab 1    amLODIPine (NORVASC) 5 mg tablet Take 1 Tab by mouth daily. 90 Tab 1    multivitamin (ONE A DAY) tablet Take 1 Tab by mouth daily.  cholecalciferol, VITAMIN D3, (VITAMIN D3) 5,000 unit tab tablet Take 5,000 Units by mouth daily. No Known Allergies    Family History   Problem Relation Age of Onset   [place]inaerica Louise Cancer Mother         leukemia    Stroke Maternal Grandmother     Hypertension Maternal Grandmother     Substance Abuse Maternal Uncle         tobacco use    Cancer Maternal Uncle         leukemia     Social History     Tobacco Use    Smoking status: Former Smoker     Types: Cigarettes     Last attempt to quit: 1979     Years since quittin.9    Smokeless tobacco: Never Used   Substance Use Topics    Alcohol use: Not Currently     Frequency: Monthly or less     Drinks per session: 1 or 2     Binge frequency: Never       Ishaan Carrasco, [de-identified] y.o.,  female    SUBJECTIVE  Ff-upt    HTN- taking enalapril and norvasc. HL- reports to have been prescribed unrecalled statin in the past causing muscle aches and wants to hold off on medication at this time. She admits to eating sweet potato fries regularly    Osteoporosis- She denies fractures.  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 Current Outpatient Medications   Medication Sig Dispense Refill    multivitamin (ONE A DAY) tablet Take 1 Tab by mouth daily.  cholecalciferol, VITAMIN D3, (VITAMIN D3) 5,000 unit tab tablet Take 5,000 Units by mouth daily.  amLODIPine (NORVASC) 5 mg tablet Take 1 Tab by mouth daily. 90 Tab 0    enalapril (VASOTEC) 20 mg tablet Take 1 Tab by mouth daily.  90 Tab 0       No Known Allergies    Past Medical History:   Diagnosis Date    High blood pressure     High cholesterol     Migraine        Social History     Socioeconomic History    Marital status:      Spouse name: Not on file    Number of children: Not on file    Years of education: Not on file    Highest education level: Not on file   Occupational History    Not on file   Social Needs    Financial resource strain: Not on file    Food insecurity:     Worry: Not on file     Inability: Not on file    Transportation needs:     Medical: Not on file     Non-medical: Not on file   Tobacco Use    Smoking status: Former Smoker     Types: Cigarettes     Last attempt to quit: 1979     Years since quittin.7    Smokeless tobacco: Never Used   Substance and Sexual Activity    Alcohol use: Yes     Frequency: Monthly or less     Drinks per session: 1 or 2     Binge frequency: Never    Drug use: Never    Sexual activity: Not Currently     Partners: Male     Birth control/protection: Surgical     Comment: tubal ligation   Lifestyle    Physical activity:     Days per week: Not on file     Minutes per session: Not on file    Stress: Not on file   Relationships    Social connections:     Talks on phone: Not on file     Gets together: Not on file     Attends [employer] service: Not on file     Active member of club or organization: Not on file     Attends meetings of clubs or organizations: Not on file     Relationship status: Not on file    Intimate partner violence:     Fear of current or ex partner: Not on file Emotionally abused: Not on file     Physically abused: Not on file     Forced sexual activity: Not on file   Other Topics Concern    Not on file   Social History Narrative    Not on file       Family History   Problem Relation Age of Onset    Cancer Mother         leukemia    Stroke Maternal Grandmother     Hypertension Maternal Grandmother     Substance Abuse Maternal Uncle         tobacco use    Cancer Maternal Uncle         leukemia         OBJECTIVE    Physical Exam:   Visit Vitals  /70 (BP 1 Location: Left arm, BP Patient Position: Sitting)   Pulse 77   Temp 98.3 °F (36.8 °C) (Oral)   Resp 16   Ht 5' 4.5\" (1.638 m)   Wt 174 lb (78.9 kg)   SpO2 96%   BMI 29.41 kg/m²         General: alert, well-appearing, AA, in no apparent distress or pain  Head: atraumatic. Non-tender maxillary and frontal sinuses  Eyes: Lids with no discharge, no matting, conjunctivae clear and non injected, full EOMs, PERLLA  Ears: pinna non-tender, external auditory canal patent, TM intact  Neck: supple, no adenopathy palpated  CVS: normal rate, regular rhythm, distinct S1 and S2  Lungs:clear to ausculation bilaterally, no crackles, wheezing or rhonchi noted  Abdomen: normoactive bowel sounds, soft, non-tender  Extremities: no edema, no cyanosis, MSK grossly normal  Skin: warm, no lesions, rashes noted  Psych:  mood and affect normal        ASSESSMENT/PLAN  Diagnoses and all orders for this visit:    1. Essential hypertension  controlled  Cont arb, ccb   Check cmp, lipid panel    2. Osteoporosis, unspecified osteoporosis type, unspecified pathologic fracture presence  Declines treatment  Cont ca/vit d/weight bearing exercises    3. Pure hypercholesterolemia  H/o myalgia on unrecalled statin  Not interested in resuming  Discussed low fat/plant based diet  Check cmp/lipid in 6 months piror to next visit    Other orders  -     amLODIPine (NORVASC) 5 mg tablet; Take 1 Tab by mouth daily.   -     enalapril (VASOTEC) 20 mg tablet; Take 1 Tab by mouth daily. · Ff-up in 6  months or sooner prn    Patient understands plan of care. Patient has provided input and agrees with goals.

## 2020-11-19 NOTE — ACP (ADVANCE CARE PLANNING)
Advance Care Planning (ACP) Physician/NP/PA Conversation      Date of Conversation: 11/19/2020  Conducted with: Patient with [address] Decision Maker:     Primary Decision Maker: Mauricio Almazan - Other Relative - [phone]  Click here to complete [place] including selection of the Healthcare Decision Maker Relationship (ie \"Primary\")  Today we documented desired Decision Maker(s), who is (are) NOT Legal Next of Kin. ACP documents are required for decision maker authority. Care Preferences:    Hospitalization: \"If your health worsens and it becomes clear that your chance of recovery is unlikely, what would be your preference regarding hospitalization? \"  The patient would prefer comfort-focused treatment without hospitalization. sees herself  Nursing home setting    Ventilation: \"If you were unable to breathe on your own and your chance of recovery was unlikely, what would be your preference about the use of a ventilator (breathing machine) if it was available to you? \"   The patient is unsure    Resuscitation: \"In the event your heart stopped as a result of an underlying serious health condition, would you want attempts to be made to restart your heart, or would you prefer a natural death? \"   The patient is unsure.       Conversation Outcomes / Follow-Up Plan:   ACP in process - information provided, considering goals and options  Referred to ACP specialist    Length of Voluntary ACP Conversation in minutes:  16 minutes    Ezio Eddy MD

## 2020-12-15 ENCOUNTER — TELEPHONE (OUTPATIENT)
Dept: CASE MANAGEMENT | Age: 80
End: 2020-12-15

## 2020-12-15 NOTE — TELEPHONE ENCOUNTER
ACP referral received. Attempted to call pt unsuccessful. No answer. The voice mail has not been set up. Will try to reach again with hopes of reaching her in 1-2 weeks.   Felisa Eller, DESHAUNW

## 2020-12-16 ENCOUNTER — TELEPHONE (OUTPATIENT)
Dept: CASE MANAGEMENT | Age: 80
End: 2020-12-16

## 2020-12-16 NOTE — TELEPHONE ENCOUNTER
Spoke with pt this am. She has two sons both living out of state. Her contact info is correct; for, this relative lives near her. Will mail APC info for her review. She can best be reached after 5:00 pm via phone. I plan to reach out in 2-3 weeks to see if she would like to schedule appt.   Bc Pedroza, NIGEL

## 2021-01-12 ENCOUNTER — TELEPHONE (OUTPATIENT)
Dept: CASE MANAGEMENT | Age: 81
End: 2021-01-12

## 2021-01-12 NOTE — TELEPHONE ENCOUNTER
Pt unavailable. I mailed info with my contact information. I will close referral but be available if she calls to schedule appt.   Kaylin Aguilar LCSW

## 2021-05-19 ENCOUNTER — IMMUNIZATION (OUTPATIENT)
Dept: FAMILY MEDICINE CLINIC | Age: 81
End: 2021-05-19

## 2021-05-20 ENCOUNTER — OFFICE VISIT (OUTPATIENT)
Dept: FAMILY MEDICINE CLINIC | Age: 81
End: 2021-05-20
Payer: MEDICARE

## 2021-05-20 VITALS
OXYGEN SATURATION: 98 % | HEART RATE: 68 BPM | HEIGHT: 65 IN | BODY MASS INDEX: 28.82 KG/M2 | WEIGHT: 173 LBS | TEMPERATURE: 97.7 F | SYSTOLIC BLOOD PRESSURE: 132 MMHG | RESPIRATION RATE: 16 BRPM | DIASTOLIC BLOOD PRESSURE: 78 MMHG

## 2021-05-20 DIAGNOSIS — M81.0 OSTEOPOROSIS, UNSPECIFIED OSTEOPOROSIS TYPE, UNSPECIFIED PATHOLOGICAL FRACTURE PRESENCE: ICD-10-CM

## 2021-05-20 DIAGNOSIS — L72.0 EPIDERMAL CYST: ICD-10-CM

## 2021-05-20 DIAGNOSIS — I10 ESSENTIAL HYPERTENSION: ICD-10-CM

## 2021-05-20 DIAGNOSIS — E78.00 PURE HYPERCHOLESTEROLEMIA: Primary | ICD-10-CM

## 2021-05-20 DIAGNOSIS — Z13.5 GLAUCOMA SCREENING: ICD-10-CM

## 2021-05-20 PROCEDURE — G8419 CALC BMI OUT NRM PARAM NOF/U: HCPCS | Performed by: FAMILY MEDICINE

## 2021-05-20 PROCEDURE — G8510 SCR DEP NEG, NO PLAN REQD: HCPCS | Performed by: FAMILY MEDICINE

## 2021-05-20 PROCEDURE — 1090F PRES/ABSN URINE INCON ASSESS: CPT | Performed by: FAMILY MEDICINE

## 2021-05-20 PROCEDURE — 99214 OFFICE O/P EST MOD 30 MIN: CPT | Performed by: FAMILY MEDICINE

## 2021-05-20 PROCEDURE — G8752 SYS BP LESS 140: HCPCS | Performed by: FAMILY MEDICINE

## 2021-05-20 PROCEDURE — 1101F PT FALLS ASSESS-DOCD LE1/YR: CPT | Performed by: FAMILY MEDICINE

## 2021-05-20 PROCEDURE — G8754 DIAS BP LESS 90: HCPCS | Performed by: FAMILY MEDICINE

## 2021-05-20 PROCEDURE — G8427 DOCREV CUR MEDS BY ELIG CLIN: HCPCS | Performed by: FAMILY MEDICINE

## 2021-05-20 PROCEDURE — G8536 NO DOC ELDER MAL SCRN: HCPCS | Performed by: FAMILY MEDICINE

## 2021-05-20 RX ORDER — ENALAPRIL MALEATE 20 MG/1
20 TABLET ORAL DAILY
Qty: 90 TABLET | Refills: 1 | Status: SHIPPED | OUTPATIENT
Start: 2021-05-20 | End: 2021-11-22

## 2021-05-20 RX ORDER — AMLODIPINE BESYLATE 5 MG/1
5 TABLET ORAL DAILY
Qty: 90 TABLET | Refills: 3 | Status: SHIPPED | OUTPATIENT
Start: 2021-05-20 | End: 2022-02-16 | Stop reason: SDUPTHER

## 2021-05-20 NOTE — PROGRESS NOTES
Cyndy Hinkle, 79 y.o.,  female    SUBJECTIVE  Ff-up    HTN- taking enalapril and norvasc. HL- pt reports improved diet, previously hesitant to try statin with h/o SE    Osteoporosis- She denies fractures. She takes ca/vit D and not interested in treatment    R preaurical tender cyst, wants removed. 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 Current Outpatient Medications   Medication Sig Dispense Refill    multivitamin (ONE A DAY) tablet Take 1 Tab by mouth daily.  cholecalciferol, VITAMIN D3, (VITAMIN D3) 5,000 unit tab tablet Take 5,000 Units by mouth daily.  amLODIPine (NORVASC) 5 mg tablet Take 1 Tab by mouth daily. 90 Tab 0    enalapril (VASOTEC) 20 mg tablet Take 1 Tab by mouth daily.  90 Tab 0       No Known Allergies    Past Medical History:   Diagnosis Date    High blood pressure     High cholesterol     Migraine        Social History     Socioeconomic History    Marital status:      Spouse name: Not on file    Number of children: Not on file    Years of education: Not on file    Highest education level: Not on file   Occupational History    Not on file   Social Needs    Financial resource strain: Not on file    Food insecurity:     Worry: Not on file     Inability: Not on file    Transportation needs:     Medical: Not on file     Non-medical: Not on file   Tobacco Use    Smoking status: Former Smoker     Types: Cigarettes     Last attempt to quit: 1979     Years since quittin.7    Smokeless tobacco: Never Used   Substance and Sexual Activity    Alcohol use: Yes     Frequency: Monthly or less     Drinks per session: 1 or 2     Binge frequency: Never    Drug use: Never    Sexual activity: Not Currently     Partners: Male     Birth control/protection: Surgical     Comment: tubal ligation   Lifestyle    Physical activity:     Days per week: Not on file     Minutes per session: Not on file    Stress: Not on file   Relationships    Social connections:     Talks on phone: Not on file     Gets together: Not on file     Attends Roman [employer] service: Not on file     Active member of club or organization: Not on file     Attends meetings of clubs or organizations: Not on file     Relationship status: Not on file    Intimate partner violence:     Fear of current or ex partner: Not on file     Emotionally abused: Not on file     Physically abused: Not on file     Forced sexual activity: Not on file   Other Topics Concern    Not on file   Social History Narrative    Not on file       Family History   Problem Relation Age of Onset    Cancer Mother         leukemia    Stroke Maternal Grandmother     Hypertension Maternal Grandmother     Substance Abuse Maternal Uncle         tobacco use    Cancer Maternal Uncle         leukemia         OBJECTIVE    Physical Exam:   Visit Vitals  /78 (BP 1 Location: Left upper arm, BP Patient Position: Sitting, BP Cuff Size: Adult)   Pulse 68   Temp 97.7 °F (36.5 °C) (Oral)   Resp 16   Ht 5' 4.5\" (1.638 m)   Wt 173 lb (78.5 kg)   SpO2 98%   BMI 29.24 kg/m²         General: alert, well-appearing, AA, in no apparent distress or pain  Head: atraumatic.  Non-tender maxillary and frontal sinuses  Eyes: Lids with no discharge, no matting, conjunctivae clear and non injected, full EOMs, PERLLA  Ears: pinna non-tender, external auditory canal patent, TM intact  Neck: supple, no adenopathy palpated  CVS: normal rate, regular rhythm, distinct S1 and S2  Lungs:clear to ausculation bilaterally, no crackles, wheezing or rhonchi noted  Abdomen: normoactive bowel sounds, soft, non-tender  Extremities: no edema, no cyanosis, MSK grossly normal  Skin: non tender smooth cyst L preaurical area  Psych:  mood and affect normal        ASSESSMENT/PLAN  Diagnoses and all orders for this visit:    1. Essential hypertension  controlled  Cont arb, ccb   Check cmp, lipid panel    2. Osteoporosis, unspecified osteoporosis type, unspecified pathologic fracture presence  Declines treatment  Cont ca/vit d/weight bearing exercises    3. Pure hypercholesterolemia  H/o myalgia on unrecalled statin  Not interested in resuming  Discussed low fat/plant based diet  Check cmp/lipid in 6 months prior to next visit    4. Epidermal cyst  Requesting removal- referral to dr. Sandy Graham    5. Glaucoma screening  Referral to eye doctor    Other orders  -     amLODIPine (NORVASC) 5 mg tablet; Take 1 Tab by mouth daily. -     enalapril (VASOTEC) 20 mg tablet; Take 1 Tab by mouth daily. · Ff-up in 6  months or sooner prn, MWV then    Patient understands plan of care. Patient has provided input and agrees with goals.

## 2021-05-20 NOTE — PROGRESS NOTES
1. Have you been to the ER, urgent care clinic since your last visit? Hospitalized since your last visit? No    2. Have you seen or consulted any other health care providers outside of the [address] [place] [address] since your last visit? Include any pap smears or colon screening.  No

## 2021-05-20 NOTE — PATIENT INSTRUCTIONS
DASH Diet: Care Instructions Your Care Instructions The DASH diet is an eating plan that can help lower your blood pressure. DASH stands for Dietary Approaches to Stop Hypertension. Hypertension is high blood pressure. The DASH diet focuses on eating foods that are high in calcium, potassium, and magnesium. These nutrients can lower blood pressure. The foods that are highest in these nutrients are fruits, vegetables, low-fat dairy products, nuts, seeds, and legumes. But taking calcium, potassium, and magnesium supplements instead of eating foods that are high in those nutrients does not have the same effect. The DASH diet also includes whole grains, fish, and poultry. The DASH diet is one of several lifestyle changes your doctor may recommend to lower your high blood pressure. Your doctor may also want you to decrease the amount of sodium in your diet. Lowering sodium while following the DASH diet can lower blood pressure even further than just the DASH diet alone. Follow-up care is a key part of your treatment and safety. Be sure to make and go to all appointments, and call your doctor if you are having problems. It's also a good idea to know your test results and keep a list of the medicines you take. How can you care for yourself at home? Following the DASH diet · Eat 4 to 5 servings of fruit each day. A serving is 1 medium-sized piece of fruit, ½ cup chopped or canned fruit, 1/4 cup dried fruit, or 4 ounces (½ cup) of fruit juice. Choose fruit more often than fruit juice. · Eat 4 to 5 servings of vegetables each day. A serving is 1 cup of lettuce or raw leafy vegetables, ½ cup of chopped or cooked vegetables, or 4 ounces (½ cup) of vegetable juice. Choose vegetables more often than vegetable juice. · Get 2 to 3 servings of low-fat and fat-free dairy each day. A serving is 8 ounces of milk, 1 cup of yogurt, or 1 ½ ounces of cheese. · Eat 6 to 8 servings of grains each day.  A serving is 1 slice of bread, 1 ounce of dry cereal, or ½ cup of cooked rice, pasta, or cooked cereal. Try to choose whole-grain products as much as possible. · Limit lean meat, poultry, and fish to 2 servings each day. A serving is 3 ounces, about the size of a deck of cards. · Eat 4 to 5 servings of nuts, seeds, and legumes (cooked dried beans, lentils, and split peas) each week. A serving is 1/3 cup of nuts, 2 tablespoons of seeds, or ½ cup of cooked beans or peas. · Limit fats and oils to 2 to 3 servings each day. A serving is 1 teaspoon of vegetable oil or 2 tablespoons of salad dressing. · Limit sweets and added sugars to 5 servings or less a week. A serving is 1 tablespoon jelly or jam, ½ cup sorbet, or 1 cup of lemonade. · Eat less than 2,300 milligrams (mg) of sodium a day. If you limit your sodium to 1,500 mg a day, you can lower your blood pressure even more. · Be aware that all of these are the suggested number of servings for people who eat 1,800 to 2,000 calories a day. Your recommended number of servings may be different if you need more or fewer calories. Tips for success · Start small. Do not try to make dramatic changes to your diet all at once. You might feel that you are missing out on your favorite foods and then be more likely to not follow the plan. Make small changes, and stick with them. Once those changes become habit, add a few more changes. · Try some of the following: ? Make it a goal to eat a fruit or vegetable at every meal and at snacks. This will make it easy to get the recommended amount of fruits and vegetables each day. ? Try yogurt topped with fruit and nuts for a snack or healthy dessert. ? Add lettuce, tomato, cucumber, and onion to sandwiches. ? Combine a ready-made pizza crust with low-fat mozzarella cheese and lots of vegetable toppings. Try using tomatoes, squash, spinach, broccoli, carrots, cauliflower, and onions. ?  Have a variety of cut-up vegetables with a low-fat dip as an appetizer instead of chips and dip. ? Sprinkle sunflower seeds or chopped almonds over salads. Or try adding chopped walnuts or almonds to cooked vegetables. ? Try some vegetarian meals using beans and peas. Add garbanzo or kidney beans to salads. Make burritos and tacos with mashed rockwell beans or black beans. Where can you learn more? Go to http://www.gray.com/ Enter Y851 in the search box to learn more about \"DASH Diet: Care Instructions. \" Current as of: August 31, 2020               Content Version: 12.8 © 2208-8697 [employer]. Care instructions adapted under license by [employer] (which disclaims liability or warranty for this information). If you have questions about a medical condition or this instruction, always ask your healthcare professional. [address] any warranty or liability for your use of this information.

## 2022-02-08 ENCOUNTER — HOSPITAL ENCOUNTER (OUTPATIENT)
Dept: LAB | Age: 82
Discharge: HOME | End: 2022-02-08

## 2022-02-08 LAB — XX-LABCORP SPECIMEN COL,LCBCF: NORMAL

## 2022-02-08 PROCEDURE — 99001 SPECIMEN HANDLING PT-LAB: CPT

## 2022-02-09 LAB
25(OH)D3+25(OH)D2 SERPL-MCNC: 39.4 NG/ML (ref 30–100)
ALBUMIN SERPL-MCNC: 4 G/DL (ref 3.6–4.6)
ALBUMIN/GLOB SERPL: 1.2 {RATIO} (ref 1.2–2.2)
ALP SERPL-CCNC: 104 IU/L (ref 44–121)
ALT SERPL-CCNC: 16 IU/L (ref 0–32)
AST SERPL-CCNC: 16 IU/L (ref 0–40)
BILIRUB SERPL-MCNC: 0.3 MG/DL (ref 0–1.2)
BUN SERPL-MCNC: 9 MG/DL (ref 8–27)
BUN/CREAT SERPL: 15 (ref 12–28)
CALCIUM SERPL-MCNC: 9.6 MG/DL (ref 8.7–10.3)
CHLORIDE SERPL-SCNC: 103 MMOL/L (ref 96–106)
CHOLEST SERPL-MCNC: 253 MG/DL (ref 100–199)
CO2 SERPL-SCNC: 23 MMOL/L (ref 20–29)
CREAT SERPL-MCNC: 0.61 MG/DL (ref 0.57–1)
GLOBULIN SER CALC-MCNC: 3.3 G/DL (ref 1.5–4.5)
GLUCOSE SERPL-MCNC: 94 MG/DL (ref 65–99)
HDLC SERPL-MCNC: 46 MG/DL
IMP & REVIEW OF LAB RESULTS: NORMAL
LDLC SERPL CALC-MCNC: 172 MG/DL (ref 0–99)
POTASSIUM SERPL-SCNC: 4.1 MMOL/L (ref 3.5–5.2)
PROT SERPL-MCNC: 7.3 G/DL (ref 6–8.5)
SODIUM SERPL-SCNC: 140 MMOL/L (ref 134–144)
TRIGL SERPL-MCNC: 186 MG/DL (ref 0–149)
VLDLC SERPL CALC-MCNC: 35 MG/DL (ref 5–40)

## 2022-02-16 ENCOUNTER — OFFICE VISIT (OUTPATIENT)
Dept: FAMILY MEDICINE CLINIC | Age: 82
End: 2022-02-16
Payer: MEDICARE

## 2022-02-16 VITALS
RESPIRATION RATE: 16 BRPM | HEIGHT: 65 IN | DIASTOLIC BLOOD PRESSURE: 68 MMHG | OXYGEN SATURATION: 98 % | SYSTOLIC BLOOD PRESSURE: 130 MMHG | TEMPERATURE: 97.5 F | BODY MASS INDEX: 28.09 KG/M2 | WEIGHT: 168.6 LBS | HEART RATE: 89 BPM

## 2022-02-16 DIAGNOSIS — Z00.00 MEDICARE ANNUAL WELLNESS VISIT, SUBSEQUENT: ICD-10-CM

## 2022-02-16 DIAGNOSIS — E78.00 PURE HYPERCHOLESTEROLEMIA: ICD-10-CM

## 2022-02-16 DIAGNOSIS — M81.0 AGE-RELATED OSTEOPOROSIS WITHOUT CURRENT PATHOLOGICAL FRACTURE: ICD-10-CM

## 2022-02-16 DIAGNOSIS — I10 ESSENTIAL HYPERTENSION: Primary | ICD-10-CM

## 2022-02-16 PROCEDURE — G0439 PPPS, SUBSEQ VISIT: HCPCS | Performed by: FAMILY MEDICINE

## 2022-02-16 PROCEDURE — 1101F PT FALLS ASSESS-DOCD LE1/YR: CPT | Performed by: FAMILY MEDICINE

## 2022-02-16 PROCEDURE — G8510 SCR DEP NEG, NO PLAN REQD: HCPCS | Performed by: FAMILY MEDICINE

## 2022-02-16 PROCEDURE — 1090F PRES/ABSN URINE INCON ASSESS: CPT | Performed by: FAMILY MEDICINE

## 2022-02-16 PROCEDURE — G8419 CALC BMI OUT NRM PARAM NOF/U: HCPCS | Performed by: FAMILY MEDICINE

## 2022-02-16 PROCEDURE — 99214 OFFICE O/P EST MOD 30 MIN: CPT | Performed by: FAMILY MEDICINE

## 2022-02-16 PROCEDURE — G8752 SYS BP LESS 140: HCPCS | Performed by: FAMILY MEDICINE

## 2022-02-16 PROCEDURE — G8754 DIAS BP LESS 90: HCPCS | Performed by: FAMILY MEDICINE

## 2022-02-16 PROCEDURE — G8427 DOCREV CUR MEDS BY ELIG CLIN: HCPCS | Performed by: FAMILY MEDICINE

## 2022-02-16 PROCEDURE — G8536 NO DOC ELDER MAL SCRN: HCPCS | Performed by: FAMILY MEDICINE

## 2022-02-16 RX ORDER — AMLODIPINE BESYLATE 5 MG/1
5 TABLET ORAL DAILY
Qty: 90 TABLET | Refills: 3 | Status: SHIPPED | OUTPATIENT
Start: 2022-02-16 | End: 2022-08-03 | Stop reason: SDUPTHER

## 2022-02-16 RX ORDER — ENALAPRIL MALEATE 20 MG/1
TABLET ORAL
Qty: 90 TABLET | Refills: 3 | Status: SHIPPED | OUTPATIENT
Start: 2022-02-16 | End: 2022-08-03 | Stop reason: SDUPTHER

## 2022-02-16 RX ORDER — PRAVASTATIN SODIUM 20 MG/1
20 TABLET ORAL
Qty: 90 TABLET | Refills: 0 | Status: SHIPPED | OUTPATIENT
Start: 2022-02-16

## 2022-02-16 RX ORDER — DORZOLAMIDE HYDROCHLORIDE AND TIMOLOL MALEATE 20; 5 MG/ML; MG/ML
SOLUTION/ DROPS OPHTHALMIC
COMMUNITY
Start: 2021-12-06

## 2022-02-16 NOTE — PROGRESS NOTES
1. Have you been to the ER, urgent care clinic since your last visit? Hospitalized since your last visit? No    2. Have you seen or consulted any other health care providers outside of the [address] [place] [address] since your last visit? Include any pap smears or colon screening.  [facility] eye exam - LOV: 12/2021    Chief Complaint   Patient presents with    Hypertension    Cholesterol Problem    Osteoporosis

## 2022-02-16 NOTE — PATIENT INSTRUCTIONS
DASH Diet: Care Instructions  Your Care Instructions     The DASH diet is an eating plan that can help lower your blood pressure. DASH stands for Dietary Approaches to Stop Hypertension. Hypertension is high blood pressure. The DASH diet focuses on eating foods that are high in calcium, potassium, and magnesium. These nutrients can lower blood pressure. The foods that are highest in these nutrients are fruits, vegetables, low-fat dairy products, nuts, seeds, and legumes. But taking calcium, potassium, and magnesium supplements instead of eating foods that are high in those nutrients does not have the same effect. The DASH diet also includes whole grains, fish, and poultry. The DASH diet is one of several lifestyle changes your doctor may recommend to lower your high blood pressure. Your doctor may also want you to decrease the amount of sodium in your diet. Lowering sodium while following the DASH diet can lower blood pressure even further than just the DASH diet alone. Follow-up care is a key part of your treatment and safety. Be sure to make and go to all appointments, and call your doctor if you are having problems. It's also a good idea to know your test results and keep a list of the medicines you take. How can you care for yourself at home? Following the DASH diet  · Eat 4 to 5 servings of fruit each day. A serving is 1 medium-sized piece of fruit, ½ cup chopped or canned fruit, 1/4 cup dried fruit, or 4 ounces (½ cup) of fruit juice. Choose fruit more often than fruit juice. · Eat 4 to 5 servings of vegetables each day. A serving is 1 cup of lettuce or raw leafy vegetables, ½ cup of chopped or cooked vegetables, or 4 ounces (½ cup) of vegetable juice. Choose vegetables more often than vegetable juice. · Get 2 to 3 servings of low-fat and fat-free dairy each day. A serving is 8 ounces of milk, 1 cup of yogurt, or 1 ½ ounces of cheese. · Eat 6 to 8 servings of grains each day.  A serving is 1 slice of bread, 1 ounce of dry cereal, or ½ cup of cooked rice, pasta, or cooked cereal. Try to choose whole-grain products as much as possible. · Limit lean meat, poultry, and fish to 2 servings each day. A serving is 3 ounces, about the size of a deck of cards. · Eat 4 to 5 servings of nuts, seeds, and legumes (cooked dried beans, lentils, and split peas) each week. A serving is 1/3 cup of nuts, 2 tablespoons of seeds, or ½ cup of cooked beans or peas. · Limit fats and oils to 2 to 3 servings each day. A serving is 1 teaspoon of vegetable oil or 2 tablespoons of salad dressing. · Limit sweets and added sugars to 5 servings or less a week. A serving is 1 tablespoon jelly or jam, ½ cup sorbet, or 1 cup of lemonade. · Eat less than 2,300 milligrams (mg) of sodium a day. If you limit your sodium to 1,500 mg a day, you can lower your blood pressure even more. · Be aware that all of these are the suggested number of servings for people who eat 1,800 to 2,000 calories a day. Your recommended number of servings may be different if you need more or fewer calories. Tips for success  · Start small. Do not try to make dramatic changes to your diet all at once. You might feel that you are missing out on your favorite foods and then be more likely to not follow the plan. Make small changes, and stick with them. Once those changes become habit, add a few more changes. · Try some of the following:  ? Make it a goal to eat a fruit or vegetable at every meal and at snacks. This will make it easy to get the recommended amount of fruits and vegetables each day. ? Try yogurt topped with fruit and nuts for a snack or healthy dessert. ? Add lettuce, tomato, cucumber, and onion to sandwiches. ? Combine a ready-made pizza crust with low-fat mozzarella cheese and lots of vegetable toppings. Try using tomatoes, squash, spinach, broccoli, carrots, cauliflower, and onions. ?  Have a variety of cut-up vegetables with a low-fat dip as an appetizer instead of chips and dip. ? Sprinkle sunflower seeds or chopped almonds over salads. Or try adding chopped walnuts or almonds to cooked vegetables. ? Try some vegetarian meals using beans and peas. Add garbanzo or kidney beans to salads. Make burritos and tacos with mashed rockwell beans or black beans. Where can you learn more? Go to http://www.hernandez.com/  Enter H967 in the search box to learn more about \"DASH Diet: Care Instructions. \"  Current as of: April 29, 2021               Content Version: 13.0  © 0771-8409 [employer] Care instructions adapted under license by [employer] (which disclaims liability or warranty for this information). If you have questions about a medical condition or this instruction, always ask your healthcare professional. [address] any warranty or liability for your use of this information. Medicare Wellness Visit, Female     The best way to live healthy is to have a lifestyle where you eat a well-balanced diet, exercise regularly, limit alcohol use, and quit all forms of tobacco/nicotine, if applicable. Regular preventive services are another way to keep healthy. Preventive services (vaccines, screening tests, monitoring & exams) can help personalize your care plan, which helps you manage your own care. Screening tests can find health problems at the earliest stages, when they are easiest to treat. [facility] follows the current, evidence-based guidelines published by the [facility] [place] [employer] (USPSTF) when recommending preventive services for our patients. Because we follow these guidelines, sometimes recommendations change over time as research supports it. (For example, mammograms used to be recommended annually.  Even though Medicare will still pay for an annual mammogram, the newer guidelines recommend a mammogram every two years for women of average risk). Of course, you and your doctor may decide to screen more often for some diseases, based on your risk and your co-morbidities (chronic disease you are already diagnosed with). Preventive services for you include:  - Medicare offers their members a free annual wellness visit, which is time for you and your primary care provider to discuss and plan for your preventive service needs. Take advantage of this benefit every year!  -All adults over the age of 72 should receive the recommended pneumonia vaccines. Current USPSTF guidelines recommend a series of two vaccines for the best pneumonia protection.   -All adults should have a flu vaccine yearly and a tetanus vaccine every 10 years.   -All adults age 48 and older should receive the shingles vaccines (series of two vaccines). -All adults age 38-68 who are overweight should have a diabetes screening test once every three years.   -All adults born between [birth year] and 1965 should be screened once for Hepatitis C.  -Other screening tests and preventive services for persons with diabetes include: an eye exam to screen for diabetic retinopathy, a kidney function test, a foot exam, and stricter control over your cholesterol.   -Cardiovascular screening for adults with routine risk involves an electrocardiogram (ECG) at intervals determined by your doctor.   -Colorectal cancer screenings should be done for adults age 54-65 with no increased risk factors for colorectal cancer. There are a number of acceptable methods of screening for this type of cancer. Each test has its own benefits and drawbacks. Discuss with your doctor what is most appropriate for you during your annual wellness visit. The different tests include: colonoscopy (considered the best screening method), a fecal occult blood test, a fecal DNA test, and sigmoidoscopy.    -A bone mass density test is recommended when a woman turns 65 to screen for osteoporosis.  This test is only recommended one time, as a screening. Some providers will use this same test as a disease monitoring tool if you already have osteoporosis. -Breast cancer screenings are recommended every other year for women of normal risk, age 54-69.  -Cervical cancer screenings for women over age 72 are only recommended with certain risk factors. Here is a list of your current Health Maintenance items (your personalized list of preventive services) with a due date: There are no preventive care reminders to display for this patient.

## 2022-02-20 NOTE — PROGRESS NOTES
This is the Subsequent Medicare Annual Wellness Exam, performed 12 months or more after the Initial AWV or the last Subsequent AWV    I have reviewed the patient's medical history in detail and updated the computerized patient record. Assessment/Plan   Education and counseling provided:  Are appropriate based on today's review and evaluation  Pneumococcal Vaccine-pt declines  Influenza Vaccine-pt declines  Cardiovascular screening blood test 2/2022  Bone mass measurement (DEXA) 2019  Screening for glaucoma annually  Diabetes screening test 2/2022      Depression Risk Factor Screening     3 most recent PHQ Screens 2/16/2022   Little interest or pleasure in doing things Not at all   Feeling down, depressed, irritable, or hopeless Not at all   Total Score PHQ 2 0       Alcohol & Drug Abuse Risk Screen    Do you average more than 1 drink per night or more than 7 drinks a week:  No    On any one occasion in the past three months have you have had more than 3 drinks containing alcohol:  No          Functional Ability and Level of Safety    Hearing: Hearing is good. Activities of Daily Living: The home contains: no safety equipment. Patient does total self care      Ambulation: with mild difficulty     Fall Risk:  Fall Risk Assessment, last 12 mths 2/16/2022   Able to walk? Yes   Fall in past 12 months? 0   Do you feel unsteady? 1   Are you worried about falling 0      Abuse Screen:  Patient is not abused       Cognitive Screening    Has your family/caregiver stated any concerns about your memory: no         Health Maintenance Due   There are no preventive care reminders to display for this patient.     Patient Care Team   Patient Care Team:  Corie Awan MD as PCP - General (Family Medicine)  Corie Awan MD as PCP - REHABILITATION [facility] Empaneled Provider    History     Patient Active Problem List   Diagnosis Code    Osteoporosis M81.0    Essential hypertension I10    Pure hypercholesterolemia E78.00 Past Medical History:   Diagnosis Date    High blood pressure     High cholesterol     Migraine       Past Surgical History:   Procedure Laterality Date    HX HEMORRHOIDECTOMY       Current Outpatient Medications   Medication Sig Dispense Refill    dorzolamide-timoloL (COSOPT) 22.3-6.8 mg/mL ophthalmic solution INSTILL 1 DROP TWICE DAILY INTO LEFT EYE ONLY      amLODIPine (NORVASC) 5 mg tablet Take 1 Tablet by mouth daily. 90 Tablet 3    enalapril (VASOTEC) 20 mg tablet TAKE 1 TABLET DAILY 90 Tablet 3    pravastatin (PRAVACHOL) 20 mg tablet Take 1 Tablet by mouth nightly. 90 Tablet 0    multivitamin (ONE A DAY) tablet Take 1 Tab by mouth daily.  cholecalciferol, VITAMIN D3, (VITAMIN D3) 5,000 unit tab tablet Take 5,000 Units by mouth daily. No Known Allergies    Family History   Problem Relation Age of Onset   Vanessa Safer Cancer Mother         leukemia    Stroke Maternal Grandmother     Hypertension Maternal Grandmother     Substance Abuse Maternal Uncle         tobacco use    Cancer Maternal Uncle         leukemia     Social History     Tobacco Use    Smoking status: Former Smoker     Types: Cigarettes     Quit date: 1979     Years since quittin.1    Smokeless tobacco: Never Used   Substance Use Topics    Alcohol use: Not Currently         MD Kelvin Mckeonkhurram Samuel, 79 y.o.,  female    SUBJECTIVE  Ff-up    HTN- taking enalapril and norvasc. HL- says more open to trying statin with h/o SE    Osteoporosis- She denies fractures. 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 Current Outpatient Medications   Medication Sig Dispense Refill    multivitamin (ONE A DAY) tablet Take 1 Tab by mouth daily.       cholecalciferol, VITAMIN D3, (VITAMIN D3) 5,000 unit tab tablet Take 5,000 Units by mouth daily.  amLODIPine (NORVASC) 5 mg tablet Take 1 Tab by mouth daily. 90 Tab 0    enalapril (VASOTEC) 20 mg tablet Take 1 Tab by mouth daily.  90 Tab 0       No Known Allergies    Past Medical History:   Diagnosis Date    High blood pressure     High cholesterol     Migraine        Social History     Socioeconomic History    Marital status:      Spouse name: Not on file    Number of children: Not on file    Years of education: Not on file    Highest education level: Not on file   Occupational History    Not on file   Social Needs    Financial resource strain: Not on file    Food insecurity:     Worry: Not on file     Inability: Not on file    Transportation needs:     Medical: Not on file     Non-medical: Not on file   Tobacco Use    Smoking status: Former Smoker     Types: Cigarettes     Last attempt to quit: 1979     Years since quittin.7    Smokeless tobacco: Never Used   Substance and Sexual Activity    Alcohol use: Yes     Frequency: Monthly or less     Drinks per session: 1 or 2     Binge frequency: Never    Drug use: Never    Sexual activity: Not Currently     Partners: Male     Birth control/protection: Surgical     Comment: tubal ligation   Lifestyle    Physical activity:     Days per week: Not on file     Minutes per session: Not on file    Stress: Not on file   Relationships    Social connections:     Talks on phone: Not on file     Gets together: Not on file     Attends [employer] service: Not on file     Active member of club or organization: Not on file     Attends meetings of clubs or organizations: Not on file     Relationship status: Not on file    Intimate partner violence:     Fear of current or ex partner: Not on file     Emotionally abused: Not on file     Physically abused: Not on file     Forced sexual activity: Not on file   Other Topics Concern    Not on file   Social History Narrative    Not on file       Family History   Problem Relation Age of Onset    Cancer Mother         leukemia    Stroke Maternal Grandmother     Hypertension Maternal Grandmother     Substance Abuse Maternal Uncle         tobacco use    Cancer Maternal Uncle         leukemia         OBJECTIVE    Physical Exam:   Visit Vitals  /68 (BP 1 Location: Left upper arm, BP Patient Position: Sitting, BP Cuff Size: Adult)   Pulse 89   Temp 97.5 °F (36.4 °C) (Temporal)   Resp 16   Ht 5' 4.5\" (1.638 m)   Wt 168 lb 9.6 oz (76.5 kg)   SpO2 98%   BMI 28.49 kg/m²         General: alert, well-appearing, AA, in no apparent distress or pain  Head: atraumatic. Non-tender maxillary and frontal sinuses  Eyes: Lids with no discharge, no matting, conjunctivae clear and non injected, full EOMs, PERLLA  Ears: pinna non-tender, external auditory canal patent, TM intact  Neck: supple, no adenopathy palpated  CVS: normal rate, regular rhythm, distinct S1 and S2  Lungs:clear to ausculation bilaterally, no crackles, wheezing or rhonchi noted  Abdomen: normoactive bowel sounds, soft, non-tender  Extremities: no edema, no cyanosis, MSK grossly normal  Skin: non tender smooth cyst L preaurical area  Psych:  mood and affect normal        ASSESSMENT/PLAN  Diagnoses and all orders for this visit:    1. Essential hypertension  controlled  Cont arb, ccb     2. Osteoporosis, unspecified osteoporosis type, unspecified pathologic fracture presence  Declines treatment  Cont ca/vit d/weight bearing exercises    3. Pure hypercholesterolemia  Discussed with her age and risk factors, limited studies on initiating statin but does appear to be higher risk for CV events. We agreed on trial pravastatin  Lipid panel/cmp prior to next visit    · Ff-up in 3  months or sooner prn    Patient understands plan of care. Patient has provided input and agrees with goals.

## 2022-03-18 PROBLEM — M81.0 OSTEOPOROSIS: Status: ACTIVE | Noted: 2020-11-19

## 2022-03-19 PROBLEM — E78.00 PURE HYPERCHOLESTEROLEMIA: Status: ACTIVE | Noted: 2020-11-19

## 2022-03-20 PROBLEM — I10 ESSENTIAL HYPERTENSION: Status: ACTIVE | Noted: 2020-11-19

## 2022-06-14 ENCOUNTER — HOSPITAL ENCOUNTER (OUTPATIENT)
Dept: LAB | Age: 82
Discharge: HOME | End: 2022-06-14

## 2022-06-14 LAB — XX-LABCORP SPECIMEN COL,LCBCF: NORMAL

## 2022-06-14 PROCEDURE — 99001 SPECIMEN HANDLING PT-LAB: CPT

## 2022-06-15 LAB
ALBUMIN SERPL-MCNC: 4.3 G/DL (ref 3.6–4.6)
ALBUMIN/GLOB SERPL: 1.5 {RATIO} (ref 1.2–2.2)
ALP SERPL-CCNC: 99 IU/L (ref 44–121)
ALT SERPL-CCNC: 13 IU/L (ref 0–32)
AST SERPL-CCNC: 16 IU/L (ref 0–40)
BILIRUB SERPL-MCNC: 0.3 MG/DL (ref 0–1.2)
BUN SERPL-MCNC: 10 MG/DL (ref 8–27)
BUN/CREAT SERPL: 17 (ref 12–28)
CALCIUM SERPL-MCNC: 9.7 MG/DL (ref 8.7–10.3)
CHLORIDE SERPL-SCNC: 104 MMOL/L (ref 96–106)
CHOLEST SERPL-MCNC: 243 MG/DL (ref 100–199)
CO2 SERPL-SCNC: 22 MMOL/L (ref 20–29)
CREAT SERPL-MCNC: 0.59 MG/DL (ref 0.57–1)
EGFR: 89 ML/MIN/1.73
GLOBULIN SER CALC-MCNC: 2.8 G/DL (ref 1.5–4.5)
GLUCOSE SERPL-MCNC: 98 MG/DL (ref 65–99)
HDLC SERPL-MCNC: 48 MG/DL
IMP & REVIEW OF LAB RESULTS: NORMAL
LDLC SERPL CALC-MCNC: 162 MG/DL (ref 0–99)
POTASSIUM SERPL-SCNC: 4.5 MMOL/L (ref 3.5–5.2)
PROT SERPL-MCNC: 7.1 G/DL (ref 6–8.5)
SODIUM SERPL-SCNC: 141 MMOL/L (ref 134–144)
TRIGL SERPL-MCNC: 181 MG/DL (ref 0–149)
VLDLC SERPL CALC-MCNC: 33 MG/DL (ref 5–40)

## 2022-06-16 NOTE — PROGRESS NOTES
Mild improvement of cholesterol on pravastatin, will continue for now  Keep appt in august and will discuss further then  Pls notify pt

## 2022-08-03 ENCOUNTER — OFFICE VISIT (OUTPATIENT)
Dept: FAMILY MEDICINE CLINIC | Age: 82
End: 2022-08-03
Payer: MEDICARE

## 2022-08-03 VITALS
BODY MASS INDEX: 28.16 KG/M2 | OXYGEN SATURATION: 98 % | DIASTOLIC BLOOD PRESSURE: 72 MMHG | HEART RATE: 63 BPM | RESPIRATION RATE: 16 BRPM | TEMPERATURE: 98.5 F | HEIGHT: 65 IN | SYSTOLIC BLOOD PRESSURE: 132 MMHG | WEIGHT: 169 LBS

## 2022-08-03 DIAGNOSIS — I10 ESSENTIAL HYPERTENSION: ICD-10-CM

## 2022-08-03 DIAGNOSIS — M81.8 AGE-RELATED OSTEOPOROSIS WITHOUT FRACTURE: ICD-10-CM

## 2022-08-03 DIAGNOSIS — E78.00 PURE HYPERCHOLESTEROLEMIA: Primary | ICD-10-CM

## 2022-08-03 PROCEDURE — 1101F PT FALLS ASSESS-DOCD LE1/YR: CPT | Performed by: FAMILY MEDICINE

## 2022-08-03 PROCEDURE — G8752 SYS BP LESS 140: HCPCS | Performed by: FAMILY MEDICINE

## 2022-08-03 PROCEDURE — G8754 DIAS BP LESS 90: HCPCS | Performed by: FAMILY MEDICINE

## 2022-08-03 PROCEDURE — 99214 OFFICE O/P EST MOD 30 MIN: CPT | Performed by: FAMILY MEDICINE

## 2022-08-03 PROCEDURE — G8417 CALC BMI ABV UP PARAM F/U: HCPCS | Performed by: FAMILY MEDICINE

## 2022-08-03 PROCEDURE — 1123F ACP DISCUSS/DSCN MKR DOCD: CPT | Performed by: FAMILY MEDICINE

## 2022-08-03 PROCEDURE — G8427 DOCREV CUR MEDS BY ELIG CLIN: HCPCS | Performed by: FAMILY MEDICINE

## 2022-08-03 PROCEDURE — G8536 NO DOC ELDER MAL SCRN: HCPCS | Performed by: FAMILY MEDICINE

## 2022-08-03 PROCEDURE — G8432 DEP SCR NOT DOC, RNG: HCPCS | Performed by: FAMILY MEDICINE

## 2022-08-03 PROCEDURE — 1090F PRES/ABSN URINE INCON ASSESS: CPT | Performed by: FAMILY MEDICINE

## 2022-08-03 RX ORDER — ENALAPRIL MALEATE 20 MG/1
TABLET ORAL
Qty: 90 TABLET | Refills: 1 | Status: SHIPPED | OUTPATIENT
Start: 2022-08-03

## 2022-08-03 RX ORDER — AMLODIPINE BESYLATE 5 MG/1
5 TABLET ORAL DAILY
Qty: 90 TABLET | Refills: 3 | Status: SHIPPED | OUTPATIENT
Start: 2022-08-03

## 2022-08-03 NOTE — PROGRESS NOTES
Antolin Mason, 79 y.o.,  female    SUBJECTIVE  Ff-up    HTN- taking enalapril and norvasc. HL-says still on the fence on taking pravastatin, she has tried few times a month w/o se, but no regularly . Osteoporosis- She denies fractures. 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 Current Outpatient Medications   Medication Sig Dispense Refill    multivitamin (ONE A DAY) tablet Take 1 Tab by mouth daily. cholecalciferol, VITAMIN D3, (VITAMIN D3) 5,000 unit tab tablet Take 5,000 Units by mouth daily. amLODIPine (NORVASC) 5 mg tablet Take 1 Tab by mouth daily. 90 Tab 0    enalapril (VASOTEC) 20 mg tablet Take 1 Tab by mouth daily.  90 Tab 0       No Known Allergies    Past Medical History:   Diagnosis Date    High blood pressure     High cholesterol     Migraine        Social History     Socioeconomic History    Marital status:      Spouse name: Not on file    Number of children: Not on file    Years of education: Not on file    Highest education level: Not on file   Occupational History    Not on file   Social Needs    Financial resource strain: Not on file    Food insecurity:     Worry: Not on file     Inability: Not on file    Transportation needs:     Medical: Not on file     Non-medical: Not on file   Tobacco Use    Smoking status: Former Smoker     Types: Cigarettes     Last attempt to quit: 1979     Years since quittin.7    Smokeless tobacco: Never Used   Substance and Sexual Activity    Alcohol use: Yes     Frequency: Monthly or less     Drinks per session: 1 or 2     Binge frequency: Never    Drug use: Never    Sexual activity: Not Currently     Partners: Male     Birth control/protection: Surgical     Comment: tubal ligation   Lifestyle    Physical activity:     Days per week: Not on file     Minutes per session: Not on file    Stress: Not on file   Relationships    Social connections:     Talks on phone: Not on file     Gets together: Not on file     Attends Zoroastrian service: Not on file     Active member of club or organization: Not on file     Attends meetings of clubs or organizations: Not on file     Relationship status: Not on file    Intimate partner violence:     Fear of current or ex partner: Not on file     Emotionally abused: Not on file     Physically abused: Not on file     Forced sexual activity: Not on file   Other Topics Concern    Not on file   Social History Narrative    Not on file       Family History   Problem Relation Age of Onset    Cancer Mother         leukemia    Stroke Maternal Grandmother     Hypertension Maternal Grandmother     Substance Abuse Maternal Uncle         tobacco use    Cancer Maternal Uncle         leukemia         OBJECTIVE    Physical Exam:   Visit Vitals  /72 (BP 1 Location: Left arm, BP Patient Position: Sitting, BP Cuff Size: Adult)   Pulse 63   Temp 98.5 °F (36.9 °C) (Temporal)   Resp 16   Ht 5' 4.5\" (1.638 m)   Wt 169 lb (76.7 kg)   SpO2 98%   BMI 28.56 kg/m²         General: alert, well-appearing, AA, in no apparent distress or pain  CVS: normal rate, regular rhythm, distinct S1 and S2  Lungs:clear to ausculation bilaterally, no crackles, wheezing or rhonchi noted  Abdomen: normoactive bowel sounds, soft, non-tender  Extremities: no edema, no cyanosis, MSK grossly normal  Skin: non tender smooth cyst L preaurical area  Psych:  mood and affect normal    Results for orders placed or performed during the hospital encounter of 06/14/22   [facility] SPECIMEN COL   Result Value Ref Range    [facility] SPEC[facility]. Specimens collected/sent to [facility]. Please direct inquiries to ([phone]). ASSESSMENT/PLAN  Diagnoses and all orders for this visit:    1.  Essential hypertension  controlled  Cont arb, ccb     2. Osteoporosis, unspecified osteoporosis type, unspecified pathologic fracture presence  Declines treatment  Cont ca/vit d/weight bearing exercises    3. Pure hypercholesterolemia  Discussed with her age and risk factors, limited studies on initiating statin but does appear to be higher risk for CV events. Cont with healthy lifestyle, she will consider resuming pravachol    Ff-up in 6  months or sooner prn, plan on MWV then    Patient understands plan of care. Patient has provided input and agrees with goals.

## 2022-08-03 NOTE — PROGRESS NOTES
Chief Complaint   Patient presents with    Cholesterol Problem    Hypertension    Osteoporosis        1. \"Have you been to the ER, urgent care clinic since your last visit? Hospitalized since your last visit? \" No    2. \"Have you seen or consulted any other health care providers outside of the [address] [place] [address] since your last visit? \" No     3. For patients aged 39-70: Has the patient had a colonoscopy / FIT/ Cologuard? NA - based on age      If the patient is female:    4. For patients aged 41-77: Has the patient had a mammogram within the past 2 years? NA - based on age or sex      11. For patients aged 21-65: Has the patient had a pap smear?  NA - based on age or sex

## 2022-11-07 ENCOUNTER — OFFICE VISIT (OUTPATIENT)
Dept: FAMILY MEDICINE CLINIC | Age: 82
End: 2022-11-07
Payer: MEDICARE

## 2022-11-07 VITALS
BODY MASS INDEX: 27.66 KG/M2 | OXYGEN SATURATION: 97 % | SYSTOLIC BLOOD PRESSURE: 136 MMHG | DIASTOLIC BLOOD PRESSURE: 78 MMHG | WEIGHT: 166 LBS | RESPIRATION RATE: 16 BRPM | HEART RATE: 70 BPM | TEMPERATURE: 97.1 F | HEIGHT: 65 IN

## 2022-11-07 DIAGNOSIS — I10 ESSENTIAL HYPERTENSION: ICD-10-CM

## 2022-11-07 DIAGNOSIS — R42 DYSEQUILIBRIUM: ICD-10-CM

## 2022-11-07 DIAGNOSIS — R42 DIZZINESS: Primary | ICD-10-CM

## 2022-11-07 DIAGNOSIS — E78.00 PURE HYPERCHOLESTEROLEMIA: ICD-10-CM

## 2022-11-07 PROCEDURE — 3074F SYST BP LT 130 MM HG: CPT | Performed by: FAMILY MEDICINE

## 2022-11-07 PROCEDURE — 1123F ACP DISCUSS/DSCN MKR DOCD: CPT | Performed by: FAMILY MEDICINE

## 2022-11-07 PROCEDURE — 1090F PRES/ABSN URINE INCON ASSESS: CPT | Performed by: FAMILY MEDICINE

## 2022-11-07 PROCEDURE — G8417 CALC BMI ABV UP PARAM F/U: HCPCS | Performed by: FAMILY MEDICINE

## 2022-11-07 PROCEDURE — G8510 SCR DEP NEG, NO PLAN REQD: HCPCS | Performed by: FAMILY MEDICINE

## 2022-11-07 PROCEDURE — G8752 SYS BP LESS 140: HCPCS | Performed by: FAMILY MEDICINE

## 2022-11-07 PROCEDURE — 99214 OFFICE O/P EST MOD 30 MIN: CPT | Performed by: FAMILY MEDICINE

## 2022-11-07 PROCEDURE — G8536 NO DOC ELDER MAL SCRN: HCPCS | Performed by: FAMILY MEDICINE

## 2022-11-07 PROCEDURE — 3078F DIAST BP <80 MM HG: CPT | Performed by: FAMILY MEDICINE

## 2022-11-07 PROCEDURE — 1101F PT FALLS ASSESS-DOCD LE1/YR: CPT | Performed by: FAMILY MEDICINE

## 2022-11-07 PROCEDURE — G8427 DOCREV CUR MEDS BY ELIG CLIN: HCPCS | Performed by: FAMILY MEDICINE

## 2022-11-07 PROCEDURE — G8754 DIAS BP LESS 90: HCPCS | Performed by: FAMILY MEDICINE

## 2022-11-07 RX ORDER — MECLIZINE HYDROCHLORIDE 25 MG/1
25 TABLET ORAL
COMMUNITY

## 2022-11-07 NOTE — PROGRESS NOTES
1. Have you been to the ER, urgent care clinic since your last visit? Hospitalized since your last visit? [facility] last week for dizziness    2. Have you seen or consulted any other health care providers outside of the [address] [place] [address] since your last visit? Include any pap smears or colon screening.  No

## 2022-11-07 NOTE — PROGRESS NOTES
Daly Nguyen, 79 y.o.,  female    SUBJECTIVE  Dizziness x 2 weeks    Pt reports episodic dizziness, described as frontal head fullness/pressure, and dysequilibrium, room spinning sensation noticed 2 weeks ago. Says she was working long hours, sleep deprived. [facility] and given meclizine she finds mostly effective and sedating. Says blood work was done unrevealing and recommended to see a specialist, but wanted to see me first.   She denies nasal congestion, ear pain/tinnitus, she denies other focal neuro deficit. She has HTN/HL    ROS:  See HPI, all others negative        Patient Active Problem List   Diagnosis Code    Osteoporosis M81.0    Essential hypertension I10    Pure hypercholesterolemia E78.00       Current Outpatient Medications   Medication Sig Dispense Refill    meclizine (ANTIVERT) 25 mg tablet Take 25 mg by mouth three (3) times daily as needed for Dizziness. amLODIPine (NORVASC) 5 mg tablet Take 1 Tablet by mouth in the morning. 90 Tablet 3    enalapril (VASOTEC) 20 mg tablet TAKE 1 TABLET DAILY 90 Tablet 1    dorzolamide-timoloL (COSOPT) 22.3-6.8 mg/mL ophthalmic solution INSTILL 1 DROP TWICE DAILY INTO LEFT EYE ONLY      pravastatin (PRAVACHOL) 20 mg tablet Take 1 Tablet by mouth nightly. 90 Tablet 0    multivitamin (ONE A DAY) tablet Take 1 Tab by mouth daily. cholecalciferol, VITAMIN D3, (VITAMIN D3) 5,000 unit tab tablet Take 5,000 Units by mouth daily.          No Known Allergies    Past Medical History:   Diagnosis Date    High blood pressure     High cholesterol     Migraine        Social History     Socioeconomic History    Marital status:      Spouse name: Not on file    Number of children: Not on file    Years of education: Not on file    Highest education level: Not on file   Occupational History    Not on file   Tobacco Use    Smoking status: Former     Types: Cigarettes     Quit date: 1979     Years since quittin.8    Smokeless tobacco: Never   Vaping Use    Vaping Use: Never used   Substance and Sexual Activity    Alcohol use: Not Currently    Drug use: Never    Sexual activity: Not Currently     Partners: Male     Birth control/protection: Surgical     Comment: tubal ligation   Other Topics Concern    Not on file   Social History Narrative    Not on file     Social Determinants of Health     Financial Resource Strain: Not on file   Food Insecurity: Not on file   Transportation Needs: Not on file   Physical Activity: Not on file   Stress: Not on file   Social Connections: Not on file   Intimate Partner Violence: Not on file   Housing Stability: Not on file       Family History   Problem Relation Age of Onset    Cancer Mother         leukemia    Stroke Maternal Grandmother     Hypertension Maternal Grandmother     Substance Abuse Maternal Uncle         tobacco use    Cancer Maternal Uncle         leukemia         OBJECTIVE    Physical Exam:     Visit Vitals  /78 (BP 1 Location: Left upper arm, BP Patient Position: Sitting, BP Cuff Size: Adult)   Pulse 70   Temp 97.1 °F (36.2 °C) (Temporal)   Resp 16   Ht 5' 4.5\" (1.638 m)   Wt 166 lb (75.3 kg)   SpO2 97%   BMI 28.05 kg/m²       General: alert,  in no apparent distress or pain  Head: atraumatic. Non-tender maxillary and frontal sinuses  Eyes: Lids with no discharge, no matting, conjunctivae clear and non injected, full EOMs, PERLLA  Ears: pinna non-tender, external auditory canal patent, TM intact  Neck: supple, no adenopathy palpated  CVS: normal rate, regular rhythm, distinct S1 and S2  Lungs:clear to ausculation bilaterally, no crackles, wheezing or rhonchi noted  Neurological: Alert, oriented to person, place and time.     CN II-XII grossly intact  Motor exam: 5/5 in bilateral upper and lower extremities   exam reveals alert, oriented, normal speech  Extremities: no edema, no cyanosis, MSK grossly normal  Skin: warm, no lesions, rashes noted  Psych:  mood and affect normal        ASSESSMENT/PLAN  Diagnoses and all orders for this visit:    1. Dizziness  Vertigo new onset  R/o intracranial lesion  -     MRI BRAIN WO CONT; Future  Reduce meclizine to 12.5 mg (half of 25 mg) prn    2. Essential hypertension    3. Pure hypercholesterolemia    4. Dysequilibrium      Follow-up and Dispositions    Return in 4 weeks (on 12/5/2022), or if symptoms worsen or fail to improve, for routine chronic illness care. Patient understands plan of care. Patient has provided input and agrees with goals.

## 2022-11-21 ENCOUNTER — TELEPHONE (OUTPATIENT)
Dept: FAMILY MEDICINE CLINIC | Age: 82
End: 2022-11-21

## 2022-11-21 NOTE — TELEPHONE ENCOUNTER
Jerry Lopez from [employer] called stating the pt scheduled her MRI of the Brain on 11/17/2022. Pt 's appt is  Jerry Lopez states the MRI has been denied by [facility] and it needs a peer to peer. Decision about the peer to peer needs to be made by 2 pm. The phone number to reach her is [phone], the reference number is [id number].

## 2022-11-22 ENCOUNTER — HOSPITAL ENCOUNTER (OUTPATIENT)
Age: 82
Discharge: HOME | End: 2022-11-22
Attending: FAMILY MEDICINE
Payer: MEDICARE

## 2022-11-22 DIAGNOSIS — R42 DIZZINESS: ICD-10-CM

## 2022-11-22 PROCEDURE — 70551 MRI BRAIN STEM W/O DYE: CPT

## 2022-11-28 ENCOUNTER — APPOINTMENT (OUTPATIENT)
Age: 82
End: 2022-11-28
Attending: FAMILY MEDICINE

## 2022-11-28 DIAGNOSIS — R42 VERTIGO: Primary | ICD-10-CM

## 2022-11-28 DIAGNOSIS — Q28.3 CAVERNOUS MALFORMATION: ICD-10-CM

## 2022-11-28 NOTE — PROGRESS NOTES
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 Suggest neurology consult to comment on this, pls notify pt, referral placed.

## 2022-11-29 NOTE — PROGRESS NOTES
Patient identified with 2 identifiers (name and ). 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 Suggest neurology consult to comment on this, pls notify pt, referral placed.

## 2023-02-14 DIAGNOSIS — R42 VERTIGO: Primary | ICD-10-CM

## 2023-02-14 DIAGNOSIS — Q28.3 CAVERNOUS MALFORMATION: ICD-10-CM

## 2023-02-23 ENCOUNTER — OFFICE VISIT (OUTPATIENT)
Age: 83
End: 2023-02-23
Payer: MEDICARE

## 2023-02-23 VITALS
TEMPERATURE: 97 F | HEART RATE: 66 BPM | SYSTOLIC BLOOD PRESSURE: 122 MMHG | HEIGHT: 65 IN | WEIGHT: 171.6 LBS | BODY MASS INDEX: 28.59 KG/M2 | RESPIRATION RATE: 16 BRPM | DIASTOLIC BLOOD PRESSURE: 70 MMHG | OXYGEN SATURATION: 97 %

## 2023-02-23 DIAGNOSIS — E78.00 PURE HYPERCHOLESTEROLEMIA: ICD-10-CM

## 2023-02-23 DIAGNOSIS — I10 ESSENTIAL HYPERTENSION: Primary | ICD-10-CM

## 2023-02-23 DIAGNOSIS — M89.9 DISORDER OF BONE: ICD-10-CM

## 2023-02-23 DIAGNOSIS — Z91.81 AT HIGH RISK FOR FALLS: ICD-10-CM

## 2023-02-23 DIAGNOSIS — R26.81 GAIT INSTABILITY: ICD-10-CM

## 2023-02-23 PROCEDURE — 1123F ACP DISCUSS/DSCN MKR DOCD: CPT | Performed by: FAMILY MEDICINE

## 2023-02-23 PROCEDURE — 99214 OFFICE O/P EST MOD 30 MIN: CPT | Performed by: FAMILY MEDICINE

## 2023-02-23 PROCEDURE — 3078F DIAST BP <80 MM HG: CPT | Performed by: FAMILY MEDICINE

## 2023-02-23 PROCEDURE — 3074F SYST BP LT 130 MM HG: CPT | Performed by: FAMILY MEDICINE

## 2023-02-23 RX ORDER — AMLODIPINE BESYLATE 5 MG/1
5 TABLET ORAL DAILY
Qty: 90 TABLET | Refills: 0 | Status: SHIPPED | OUTPATIENT
Start: 2023-02-23

## 2023-02-23 RX ORDER — ENALAPRIL MALEATE 20 MG/1
TABLET ORAL
Qty: 90 TABLET | Refills: 0 | Status: SHIPPED | OUTPATIENT
Start: 2023-02-23

## 2023-02-23 RX ORDER — ELECTROLYTES/DEXTROSE
1 SOLUTION, ORAL ORAL DAILY
COMMUNITY

## 2023-02-23 SDOH — ECONOMIC STABILITY: INCOME INSECURITY: HOW HARD IS IT FOR YOU TO PAY FOR THE VERY BASICS LIKE FOOD, HOUSING, MEDICAL CARE, AND HEATING?: SOMEWHAT HARD

## 2023-02-23 SDOH — ECONOMIC STABILITY: FOOD INSECURITY: WITHIN THE PAST 12 MONTHS, YOU WORRIED THAT YOUR FOOD WOULD RUN OUT BEFORE YOU GOT MONEY TO BUY MORE.: SOMETIMES TRUE

## 2023-02-23 SDOH — ECONOMIC STABILITY: HOUSING INSECURITY
IN THE LAST 12 MONTHS, WAS THERE A TIME WHEN YOU DID NOT HAVE A STEADY PLACE TO SLEEP OR SLEPT IN A SHELTER (INCLUDING NOW)?: NO

## 2023-02-23 SDOH — ECONOMIC STABILITY: FOOD INSECURITY: WITHIN THE PAST 12 MONTHS, THE FOOD YOU BOUGHT JUST DIDN'T LAST AND YOU DIDN'T HAVE MONEY TO GET MORE.: SOMETIMES TRUE

## 2023-02-23 NOTE — PROGRESS NOTES
Alejandra Rubio, 79 y.o.,  female    SUBJECTIVE  Ff-up    HTN- taking enalapril and norvasc. HL-says still on the fence on taking pravastatin, she has tried few times a month w/o se, but no regularly . Osteoporosis- She denies fractures. She takes ca/vit D and not interested in treatment     Dizziness has resolved      ROS:  See HPI, all others negative        There is no problem list on file for this patient.     Current Outpatient Medications:     Multiple Vitamin (MULTIVITAMIN ADULT) TABS, Take 1 tablet by mouth daily, Disp: , Rfl:     amLODIPine (NORVASC) 5 MG tablet, Take 1 tablet by mouth daily, Disp: 90 tablet, Rfl: 0    enalapril (VASOTEC) 20 MG tablet, TAKE 1 TABLET DAILY, Disp: 90 tablet, Rfl: 0    vitamin D3 (CHOLECALCIFEROL) 125 MCG (5000 UT) TABS tablet, Take 5,000 Units by mouth daily, Disp: , Rfl:     dorzolamide-timolol (COSOPT) 22.3-6.8 MG/ML ophthalmic solution, INSTILL 1 DROP TWICE DAILY INTO LEFT EYE ONLY, Disp: , Rfl:     pravastatin (PRAVACHOL) 20 MG tablet, Take 20 mg by mouth, Disp: , Rfl:       No Known Allergies    Past Medical History:   Diagnosis Date    High blood pressure     High cholesterol     Migraine        Social History     Socioeconomic History    Marital status:      Spouse name: Not on file    Number of children: Not on file    Years of education: Not on file    Highest education level: Not on file   Occupational History    Not on file   Social Needs    Financial resource strain: Not on file    Food insecurity:     Worry: Not on file     Inability: Not on file    Transportation needs:     Medical: Not on file     Non-medical: Not on file   Tobacco Use    Smoking status: Former Smoker     Types: Cigarettes     Last attempt to quit: 1979     Years since quittin.7    Smokeless tobacco: Never Used   Substance and Sexual Activity    Alcohol use: Yes     Frequency: Monthly or less     Drinks per session: 1 or 2     Binge frequency: Never    Drug use: Never    Sexual activity: Not Currently     Partners: Male     Birth control/protection: Surgical     Comment: tubal ligation   Lifestyle    Physical activity:     Days per week: Not on file     Minutes per session: Not on file    Stress: Not on file   Relationships    Social connections:     Talks on phone: Not on file     Gets together: Not on file     Attends Anglican service: Not on file     Active member of club or organization: Not on file     Attends meetings of clubs or organizations: Not on file     Relationship status: Not on file    Intimate partner violence:     Fear of current or ex partner: Not on file     Emotionally abused: Not on file     Physically abused: Not on file     Forced sexual activity: Not on file   Other Topics Concern    Not on file   Social History Narrative    Not on file       Family History   Problem Relation Age of Onset    Cancer Mother         leukemia    Stroke Maternal Grandmother     Hypertension Maternal Grandmother     Substance Abuse Maternal Uncle         tobacco use    Cancer Maternal Uncle         leukemia         OBJECTIVE    Physical Exam:   /70 (Site: Right Upper Arm, Position: Sitting, Cuff Size: Medium Adult) Comment: Mannually Rechecked  Pulse 66   Temp 97 °F (36.1 °C) (Temporal)   Resp 16   Ht 5' 4.5\" (1.638 m)   Wt 171 lb 9.6 oz (77.8 kg)   SpO2 97%   BMI 29.00 kg/m²         General: alert, frail elderly,  AA, in no apparent distress or pain  CVS: normal rate, regular rhythm, distinct S1 and S2  Lungs:clear to ausculation bilaterally, no crackles, wheezing or rhonchi noted  Extremities: no edema, no cyanosis, MSK grossly normal  Skin: non tender smooth cyst L preaurical area  Psych:  mood and affect normal    home safety tips provided, referral to physical therapy provided for strength and balance training      ASSESSMENT/PLAN  Diagnoses and all orders for this visit:    Essential hypertension  controlled  Cont arb, ccb   -     Comprehensive Metabolic Panel; Future  -     Lipid Panel; Future  Osteoporosis, unspecified osteoporosis type, unspecified pathologic fracture presence  Declines treatment  Cont ca/vit d/weight bearing exercises    Pure hypercholesterolemia  Discussed with her age and risk factors, limited studies on initiating statin but does appear to be higher risk for CV events. Cont with healthy lifestyle, she will consider resuming pravachol  Concetta Bill was seen today for dizziness, hypertension and cholesterol problem. Disorder of bone  -     Vitamin D 25 Hydroxy; Future    At high risk for falls    Gait instability  -     [facility], [place]    Other orders  -     amLODIPine (NORVASC) 5 MG tablet; Take 1 tablet by mouth daily  -     enalapril (VASOTEC) 20 MG tablet; TAKE 1 TABLET DAILY      Ff-up in 2 months or sooner prn, plan on  [facility] then    Patient understands plan of care. Patient has provided input and agrees with goals. On the basis of positive falls risk screening, assessment and plan is as follows: Matthew Woodward

## 2023-02-23 NOTE — PROGRESS NOTES
1. \"Have you been to the ER, urgent care clinic since your last visit? Hospitalized since your last visit? \" No    2. \"Have you seen or consulted any other health care providers outside of the [address] [place] [address] since your last visit? \" No     3. For patients aged 39-70: Has the patient had a colonoscopy / FIT/ Cologuard? NA - based on age      If the patient is female:    4. For patients aged 41-77: Has the patient had a mammogram within the past 2 years? NA - based on age or sex      11. For patients aged 21-65: Has the patient had a pap smear? NA - based on age or sex    Chief Complaint   Patient presents with    Dizziness    Hypertension    Cholesterol Problem     Per Dr. Chacha Griffin recommendation, we will do follow-up visit today. AWV to be completed at next visit.

## 2023-02-23 NOTE — PATIENT INSTRUCTIONS
FINANCIAL RESOURCES    [facility] Financial Assistance  What they offer: The [employer] Program helps uninsured patients who do not qualify for government-sponsored health insurance and cannot afford to pay for their medical care. Insured patient may also qualify for assistance based on family income, family size, and medical needs. Phone Number: [phone]  How to apply for the [employer] Program:            #    Option 1: To apply for financial assistance, a patient (or their family or other provider) should fill out the Financial Assistance Application. Copies of the Financial Assistance Application and the FAP may be obtained for free by calling the [facility] Presidioer service department at [phone]. #    Option 2:  The Financial Assistance Application and policy may be obtained for free by downloading a copy from the Arisoko:                     #   http://viaCycle.Tangent Medical Technologies/. com/patient-resources/financial-assistance                     #   Applications are available in several languages on the website    The Slide. Varthana. [employer]  What they offer: The Sanjeev Program can assist you if youre uninsured and have been diagnosed with chronic, debilitating, or life-threatening disease. Phone Number: [phone]  Website: www. Addepar    UTILITIES    CommonHelp  What they offer: Partnership with the [place] [address]. Assist with   finding and applying for government funded programs and benefits. You can also update  your benefits or report changes through Luminoso Technologies.   Website: Micreos.Virtual Intelligence Technologies  Phone Number: 191-3NFFZBQ ([phone])      Nilda 99 they offer: Edel Aguilera is [facility] energy assistance program of last resort for                 anyone who faces financial hardships from unemployment or family crisis. o Phone Number: [phone]        o Address: Tony Daly, [address]        o Website: Silver Lining Limited/[place]/billing/billing-options/energyshare     Energy Assistance Programs (EAP) - Department of           o What they offer: EAP assists low income households in meeting their immediate home energy needs. o Phone Number: [phone] or contact your local department of         o Website: https://Cycell.SuccessTSM/     Good Rx   o What they offer: Good Rx tracks prescription drug prices and provides free drug coupons for discounts on medications. o Website: Make Meaning/    NeedyMeds   o What they offer: NeedyMeds offers free information on medications and healthcare cost savings programs including prescription assistance programs, coupons, and discount programs. o Website: PaymentBack.Navman Wireless OEM Solutions org/   o Helpline: 192.725.2459     RX Assist   o What they offer: Information about free and low-cost medicine programs. o Website: https://Bar Pass/    TidyClubmart $4 Prescription Program   o What they offer: Prescription Program includes up to a 30-day supply for $4 and a 90-day supply for $10 of some covered generic drugs at commonly prescribed dosages   o Website: Shaquille      The Co-Pay Relief Program  What they offer: The Co-Pay Relief Program provides you with direct prescription co-payment assistance, if you are an insured U.S. citizen and financially and medically qualify, including Medicare Part D beneficiaries who require assistance with their prescription drug co-payments. Phone toll-free: 1-210.158.7270  Website: www.SAFE ID Solutions. org    The Partnership for Prescription Assistance   What they offer:   The Partnership for Prescription Assistance can help you if you lack Prescription coverage to get the medicines you need through the public or private program that is right for you. Phone toll-free: 1-453.708.6283  Website:  www. pparx. [address]  o    What they offer: The [address] [place] [address] gives you and your family access to a free Prescription Drug Card program and savings of up to 75% at more than 50,000 national and The [employer]. Phone toll-free: 6-201.265.2396  Website: www.Muufri    additional resources? Call 211 or Find Help: https://www. Samesurf.org/ FOOD RESOURCES    [facility]  What they offer: Assistance with finding a food pantry, soup kitchen or resource near you. Website: https://Mines.io.org/get-help/community-resources/  Provide instructions for assistance with Georges De La Vega (Food Le Center) application on this site. SNAP ([address])  What they offer: SNAP is used like cash to buy eligible food items from authorized retailers. Apply for benefits online: Leroy.  Apply for benefits by telephone: [phone]    Meals on Wheels  What they offer: Meals on Wheels is a program that delivers meals to individuals at home who are unable to purchase or prepare their own meals. Website: https://Moobiamore. org/how-we-help/meals-on-wheels/  Requirements can vary by program and areas served. To apply:  [facility] [address]: [phone] (URA -Fri 8:30 a.m. to 4:30 p.m.)  Coverage Area:  [facility], [address] [place] [address],[address]  Website: www.ssseva.org/contact-us/  Parveen Fuentes [address] On [phone]   Coverage Areas: [address] [facility], [address] [place] [address]. [facility]  What they offer:  Oasis provides comprehensive services to the disadvantaged/low-income community, homebound seniors and homeless in [facility], and [facility].   Phone Number: [phone]  Services:  Food pantry, [address],[place] [address], [address], Senior Grocery Delivery Program, Food Bank, Provides assistance with completing [employer]. Website: https://www.FrogApps/. org/services    Need additional resources? Call 211 or Find Help: https://www. Fairwinds CCCp.org/FINANCIAL RESOURCES    [employer]  What they offer: The [employer] Program helps uninsured patients who do not qualify for government-sponsored health insurance and cannot afford to pay for their medical care. Insured patient may also qualify for assistance based on family income, family size, and medical needs. Phone Number: [phone]  How to apply for the [employer] Program:            #    Option 1: To apply for financial assistance, a patient (or their family or other provider) should fill out the Financial Assistance Application. Copies of the Financial Assistance Application and the FAP may be obtained for free by calling the [facility] customer service department at [phone]. #    Option 2:  The Financial Assistance Application and policy may be obtained for free by downloading a copy from the TasteBook:                     #   http://Iperia/. com/patient-resources/financial-assistance                     #   Applications are available in several languages on the website    The [facility]. GeckoGo. org  What they offer: The [facility] Program can assist you if youre uninsured and have been diagnosed with chronic, debilitating, or life-threatening disease. Phone Number: [phone]  Website: www. 3D Control Systems    UTILITIES    CommonHelPigafe  What they offer: Partnership with the [place] [address]. Assist with   finding and applying for government funded programs and benefits. You can also update  your benefits or report changes through Kinesio Capture.   Website:   Phone Number: 801-5PLGIVE ([phone])      Nilda 99 they offer: EnergyShare is [employer]s energy assistance program of last resort for                 anyone who faces financial hardships from unemployment or family crisis. o Phone Number: [phone]        o Address: Malika [address] [place] [address], [address] [facility]        o Website: Choister/[place]/billing/billing-options/energyshare     Energy Assistance Programs (EAP) - Department of           o What they offer: EAP assists low income households in meeting their immediate home energy needs. o Phone Number: [phone] or contact your local department of         o Website: https://Traansmission.Stampsy/     Good Rx   o What they offer: Good Rx tracks prescription drug prices and provides free drug coupons for discounts on medications. o Website: Netsize/    NeedyMeds   o What they offer: NeedyMeds offers free information on medications and healthcare cost savings programs including prescription assistance programs, coupons, and discount programs. o Website: PaymentBack.Aggregate Knowledge. org/   o Helpline: 295.262.6007     RX Assist   o What they offer: Information about free and low-cost medicine programs. o Website: https://CallTech Communications/    Walmart $4 Prescription Program   o What they offer: Prescription Program includes up to a 30-day supply for $4 and a 90-day supply for $10 of some covered generic drugs at commonly prescribed dosages   o Website: Sveta.de      The Co-Pay Relief Program  What they offer:  The Co-Pay Relief Program provides you with direct prescription co-payment assistance, if you are an insured U.S. citizen and financially and medically qualify, including Medicare Part D beneficiaries who require assistance with their prescription drug co-payments. Phone toll-free: [phone]  Website: www.Collusionay. org    The Partnership for Prescription Assistance   What they offer: The Partnership for Prescription Assistance can help you if you lack Prescription coverage to get the medicines you need through the public or private program that is right for you. Phone toll-free: [phone]  Website:  Candy Lab. pparx. Global Nano Products [address]    What they offer: The [address] [place] [address] gives you and your family access to a free Prescription Drug Card program and savings of up to 75% at more than 50,000 national and The TJX Tegile Systems. Phone toll-free: 3-670.225.8763  Website: www.Editlite    additional resources? Call 211 or Find Help: https://www. CO3 Venturesp.org/ HOUSING RESOURCES    The [facility] Financial for [facility])  [address], [address] [facility], [address]  [phone]  Helpful Info: Works with over 52 houses of anuja in [place], [place], [place], and students from [address] [place] [address]. Provides nightly shelter for single homeless persons from mid-November through the first week of May each year. [address] with other organizations to utilize support services to move the homeless person from dependency to self-sufficiency. [place] [facility]  [place]  [phone]  Helpful Info: Long-term transitional housing program for single and single pregnant women. They provide comprehensive support services, transitional housing, and skills to prevent and break the cycle of homelessness. [address] ([address]  [phone]  Helpful Info: Open 24/7, 365 days a year. Emergency shelter for Men, Single Women, and Women with children. Residents receive 3 meals a day, clothing, toiletries, shower and laundry services, case management, and counseling. .E.L.P.  [address] [place] [address] [address], [address] [facility]  [phone]  Helpful Info: Referrals are ONLY accepted through the [address] [place] [address] ([phone]). Limited space/beds available. Their program provides short-term subsidized shelter and includes intensive case management. [address] [facility] 46 411 [facility], [address] [place] [address]   [phone]  Helpful Info: Provide emergency shelter to families and children. The crisis line is the central point of contact for all persons experiencing a housing crisis throughout [place]. Need additional resources? Call 211 or Find Help: https://www. findhelp.org/

## 2023-04-18 ENCOUNTER — HOSPITAL ENCOUNTER (OUTPATIENT)
Facility: HOSPITAL | Age: 83
Discharge: HOME | End: 2023-04-21

## 2023-04-18 LAB — LABCORP SPECIMEN COLLECTION: NORMAL

## 2023-04-18 PROCEDURE — 99001 SPECIMEN HANDLING PT-LAB: CPT

## 2023-04-19 LAB
25(OH)D3+25(OH)D2 SERPL-MCNC: 42 NG/ML (ref 30–100)
ALBUMIN SERPL-MCNC: 4.2 G/DL (ref 3.6–4.6)
ALBUMIN/GLOB SERPL: 1.6 {RATIO} (ref 1.2–2.2)
ALP SERPL-CCNC: 91 IU/L (ref 44–121)
ALT SERPL-CCNC: 16 IU/L (ref 0–32)
AST SERPL-CCNC: 15 IU/L (ref 0–40)
BILIRUB SERPL-MCNC: 0.4 MG/DL (ref 0–1.2)
BUN SERPL-MCNC: 11 MG/DL (ref 8–27)
BUN/CREAT SERPL: 16 (ref 12–28)
CALCIUM SERPL-MCNC: 9.3 MG/DL (ref 8.7–10.3)
CHLORIDE SERPL-SCNC: 107 MMOL/L (ref 96–106)
CHOLEST SERPL-MCNC: 239 MG/DL (ref 100–199)
CO2 SERPL-SCNC: 22 MMOL/L (ref 20–29)
CREAT SERPL-MCNC: 0.69 MG/DL (ref 0.57–1)
EGFRCR SERPLBLD CKD-EPI 2021: 86 ML/MIN/1.73
GLOBULIN SER CALC-MCNC: 2.6 G/DL (ref 1.5–4.5)
GLUCOSE SERPL-MCNC: 96 MG/DL (ref 70–99)
HDLC SERPL-MCNC: 44 MG/DL
LDLC SERPL CALC-MCNC: 162 MG/DL (ref 0–99)
POTASSIUM SERPL-SCNC: 4.1 MMOL/L (ref 3.5–5.2)
PROT SERPL-MCNC: 6.8 G/DL (ref 6–8.5)
SODIUM SERPL-SCNC: 144 MMOL/L (ref 134–144)
TRIGL SERPL-MCNC: 179 MG/DL (ref 0–149)
VLDLC SERPL CALC-MCNC: 33 MG/DL (ref 5–40)

## 2023-04-20 ENCOUNTER — OFFICE VISIT (OUTPATIENT)
Age: 83
End: 2023-04-20

## 2023-04-20 DIAGNOSIS — I10 ESSENTIAL (PRIMARY) HYPERTENSION: ICD-10-CM

## 2023-04-20 DIAGNOSIS — Z00.00 MEDICARE ANNUAL WELLNESS VISIT, SUBSEQUENT: Primary | ICD-10-CM

## 2023-04-20 DIAGNOSIS — E78.00 PURE HYPERCHOLESTEROLEMIA: ICD-10-CM

## 2023-04-20 DIAGNOSIS — M81.0 AGE-RELATED OSTEOPOROSIS WITHOUT CURRENT PATHOLOGICAL FRACTURE: ICD-10-CM

## 2023-04-20 SDOH — ECONOMIC STABILITY: FOOD INSECURITY: WITHIN THE PAST 12 MONTHS, THE FOOD YOU BOUGHT JUST DIDN'T LAST AND YOU DIDN'T HAVE MONEY TO GET MORE.: NEVER TRUE

## 2023-04-20 SDOH — ECONOMIC STABILITY: INCOME INSECURITY: HOW HARD IS IT FOR YOU TO PAY FOR THE VERY BASICS LIKE FOOD, HOUSING, MEDICAL CARE, AND HEATING?: SOMEWHAT HARD

## 2023-04-20 SDOH — ECONOMIC STABILITY: FOOD INSECURITY: WITHIN THE PAST 12 MONTHS, YOU WORRIED THAT YOUR FOOD WOULD RUN OUT BEFORE YOU GOT MONEY TO BUY MORE.: NEVER TRUE

## 2023-04-20 ASSESSMENT — PATIENT HEALTH QUESTIONNAIRE - PHQ9
SUM OF ALL RESPONSES TO PHQ QUESTIONS 1-9: 0
SUM OF ALL RESPONSES TO PHQ QUESTIONS 1-9: 0
2. FEELING DOWN, DEPRESSED OR HOPELESS: 0
SUM OF ALL RESPONSES TO PHQ9 QUESTIONS 1 & 2: 0
SUM OF ALL RESPONSES TO PHQ QUESTIONS 1-9: 0
1. LITTLE INTEREST OR PLEASURE IN DOING THINGS: 0
SUM OF ALL RESPONSES TO PHQ QUESTIONS 1-9: 0

## 2023-04-20 ASSESSMENT — LIFESTYLE VARIABLES
HOW MANY STANDARD DRINKS CONTAINING ALCOHOL DO YOU HAVE ON A TYPICAL DAY: PATIENT DOES NOT DRINK
HOW OFTEN DO YOU HAVE A DRINK CONTAINING ALCOHOL: NEVER

## 2023-04-24 VITALS
DIASTOLIC BLOOD PRESSURE: 76 MMHG | SYSTOLIC BLOOD PRESSURE: 132 MMHG | OXYGEN SATURATION: 97 % | WEIGHT: 174.4 LBS | HEIGHT: 65 IN | RESPIRATION RATE: 16 BRPM | BODY MASS INDEX: 29.06 KG/M2 | HEART RATE: 65 BPM

## 2023-10-24 ENCOUNTER — OFFICE VISIT (OUTPATIENT)
Age: 83
End: 2023-10-24
Payer: MEDICARE

## 2023-10-24 VITALS
HEART RATE: 80 BPM | TEMPERATURE: 98 F | DIASTOLIC BLOOD PRESSURE: 84 MMHG | SYSTOLIC BLOOD PRESSURE: 136 MMHG | RESPIRATION RATE: 16 BRPM | HEIGHT: 65 IN | WEIGHT: 173 LBS | BODY MASS INDEX: 28.82 KG/M2 | OXYGEN SATURATION: 93 %

## 2023-10-24 DIAGNOSIS — M81.0 OSTEOPOROSIS WITHOUT CURRENT PATHOLOGICAL FRACTURE, UNSPECIFIED OSTEOPOROSIS TYPE: ICD-10-CM

## 2023-10-24 DIAGNOSIS — I10 ESSENTIAL HYPERTENSION: Primary | ICD-10-CM

## 2023-10-24 DIAGNOSIS — E78.00 PURE HYPERCHOLESTEROLEMIA: ICD-10-CM

## 2023-10-24 DIAGNOSIS — E55.9 VITAMIN D DEFICIENCY: ICD-10-CM

## 2023-10-24 PROCEDURE — 3075F SYST BP GE 130 - 139MM HG: CPT | Performed by: FAMILY MEDICINE

## 2023-10-24 PROCEDURE — 3079F DIAST BP 80-89 MM HG: CPT | Performed by: FAMILY MEDICINE

## 2023-10-24 PROCEDURE — 1123F ACP DISCUSS/DSCN MKR DOCD: CPT | Performed by: FAMILY MEDICINE

## 2023-10-24 PROCEDURE — 99214 OFFICE O/P EST MOD 30 MIN: CPT | Performed by: FAMILY MEDICINE

## 2023-10-24 RX ORDER — ENALAPRIL MALEATE 20 MG/1
TABLET ORAL
Qty: 90 TABLET | Refills: 1 | Status: SHIPPED | OUTPATIENT
Start: 2023-10-24

## 2023-10-24 RX ORDER — AMLODIPINE BESYLATE 5 MG/1
5 TABLET ORAL DAILY
Qty: 90 TABLET | Refills: 3 | Status: SHIPPED | OUTPATIENT
Start: 2023-10-24

## 2023-10-24 ASSESSMENT — PATIENT HEALTH QUESTIONNAIRE - PHQ9
1. LITTLE INTEREST OR PLEASURE IN DOING THINGS: 0
SUM OF ALL RESPONSES TO PHQ9 QUESTIONS 1 & 2: 0
SUM OF ALL RESPONSES TO PHQ QUESTIONS 1-9: 0
SUM OF ALL RESPONSES TO PHQ QUESTIONS 1-9: 0
2. FEELING DOWN, DEPRESSED OR HOPELESS: 0
2. FEELING DOWN, DEPRESSED OR HOPELESS: 0
SUM OF ALL RESPONSES TO PHQ QUESTIONS 1-9: 0
SUM OF ALL RESPONSES TO PHQ QUESTIONS 1-9: 0
1. LITTLE INTEREST OR PLEASURE IN DOING THINGS: 0
SUM OF ALL RESPONSES TO PHQ QUESTIONS 1-9: 0
SUM OF ALL RESPONSES TO PHQ QUESTIONS 1-9: 0
SUM OF ALL RESPONSES TO PHQ9 QUESTIONS 1 & 2: 0
SUM OF ALL RESPONSES TO PHQ QUESTIONS 1-9: 0
SUM OF ALL RESPONSES TO PHQ QUESTIONS 1-9: 0

## 2023-10-24 NOTE — PROGRESS NOTES
1. \"Have you been to the ER, urgent care clinic since your last visit? Hospitalized since your last visit? \" No    2. \"Have you seen or consulted any other health care providers outside of the [address] [place] [address] since your last visit? \" No     3. For patients aged 43-73: Has the patient had a colonoscopy / FIT/ Cologuard? NA - based on age      If the patient is female:    4. For patients aged 43-66: Has the patient had a mammogram within the past 2 years? NA - based on age or sex      11. For patients aged 21-65: Has the patient had a pap smear?  NA - based on age or sex

## 2023-10-24 NOTE — PROGRESS NOTES
SUBJECTIVE  Ff-up    HTN- taking enalapril and norvasc. HL- reviewed cv risk factors, she declines statin , given her age benefit ratio data is limited, we agreed on no longer checking values    Osteoporosis- She denies fractures. She takes ca/vit D and not interested in treatment       ROS:  See HPI, all others negative        There is no problem list on file for this patient.     Current Outpatient Medications:     amLODIPine (NORVASC) 5 MG tablet, Take 1 tablet by mouth daily, Disp: 90 tablet, Rfl: 3    enalapril (VASOTEC) 20 MG tablet, TAKE 1 TABLET DAILY, Disp: 90 tablet, Rfl: 1    Multiple Vitamin (MULTIVITAMIN ADULT) TABS, Take 1 tablet by mouth daily, Disp: , Rfl:     vitamin D3 (CHOLECALCIFEROL) 125 MCG (5000 UT) TABS tablet, Take 1 tablet by mouth daily, Disp: , Rfl:     dorzolamide-timolol (COSOPT) 22.3-6.8 MG/ML ophthalmic solution, INSTILL 1 DROP TWICE DAILY INTO LEFT EYE ONLY, Disp: , Rfl:       No Known Allergies    Past Medical History:   Diagnosis Date    High blood pressure     High cholesterol     Migraine        Social History     Socioeconomic History    Marital status:      Spouse name: Not on file    Number of children: Not on file    Years of education: Not on file    Highest education level: Not on file   Occupational History    Not on file   Social Needs    Financial resource strain: Not on file    Food insecurity:     Worry: Not on file     Inability: Not on file    Transportation needs:     Medical: Not on file     Non-medical: Not on file   Tobacco Use    Smoking status: Former Smoker     Types: Cigarettes     Last attempt to quit: 1979     Years since quittin.7    Smokeless tobacco: Never Used   Substance and Sexual Activity    Alcohol use: Yes     Frequency: Monthly or less     Drinks per session: 1 or 2     Binge frequency: Never    Drug use: Never    Sexual activity: Not Currently     Partners: Male     Birth control/protection: Surgical     Comment: tubal

## 2024-03-05 NOTE — TELEPHONE ENCOUNTER
----- Message from Cristel Cope sent at 3/5/2024  9:57 AM EST -----  Subject: Refill Request    QUESTIONS  Name of Medication? enalapril (VASOTEC) 20 MG tablet  Patient-reported dosage and instructions? 1 daily  How many days do you have left? 0  Preferred Pharmacy? [facility]  Pharmacy phone number (if available)? [phone]  Additional Information for Provider? Pt is out of meds. Has appt 4/23  ---------------------------------------------------------------------------  --------------  CALL BACK INFO  What is the best way for the office to contact you? Do not leave any   message, patient will call back for answer  Preferred Call Back Phone Number? [phone]  ---------------------------------------------------------------------------  --------------  SCRIPT ANSWERS  Relationship to Patient? Self

## 2024-03-06 RX ORDER — ENALAPRIL MALEATE 20 MG/1
TABLET ORAL
Qty: 90 TABLET | Refills: 1 | Status: SHIPPED | OUTPATIENT
Start: 2024-03-06

## 2024-05-09 ENCOUNTER — OFFICE VISIT (OUTPATIENT)
Age: 84
End: 2024-05-09
Payer: MEDICARE

## 2024-05-09 ENCOUNTER — HOSPITAL ENCOUNTER (OUTPATIENT)
Facility: HOSPITAL | Age: 84
Discharge: HOME | End: 2024-05-09
Payer: MEDICARE

## 2024-05-09 VITALS
SYSTOLIC BLOOD PRESSURE: 136 MMHG | OXYGEN SATURATION: 98 % | WEIGHT: 176 LBS | HEIGHT: 65 IN | RESPIRATION RATE: 16 BRPM | BODY MASS INDEX: 29.32 KG/M2 | DIASTOLIC BLOOD PRESSURE: 84 MMHG | TEMPERATURE: 98.3 F | HEART RATE: 72 BPM

## 2024-05-09 DIAGNOSIS — M81.0 OSTEOPOROSIS WITHOUT CURRENT PATHOLOGICAL FRACTURE, UNSPECIFIED OSTEOPOROSIS TYPE: ICD-10-CM

## 2024-05-09 DIAGNOSIS — E78.00 PURE HYPERCHOLESTEROLEMIA: ICD-10-CM

## 2024-05-09 DIAGNOSIS — E55.9 VITAMIN D DEFICIENCY: ICD-10-CM

## 2024-05-09 DIAGNOSIS — I10 ESSENTIAL HYPERTENSION: Primary | ICD-10-CM

## 2024-05-09 LAB
25(OH)D3 SERPL-MCNC: 44.5 NG/ML (ref 30–100)
ALBUMIN SERPL-MCNC: 3.7 G/DL (ref 3.4–5)
ALBUMIN/GLOB SERPL: 1.1 (ref 0.8–1.7)
ALP SERPL-CCNC: 96 U/L (ref 45–117)
ALT SERPL-CCNC: 27 U/L (ref 13–56)
ANION GAP SERPL CALC-SCNC: 3 MMOL/L (ref 3–18)
AST SERPL-CCNC: 17 U/L (ref 10–38)
BILIRUB SERPL-MCNC: 1.2 MG/DL (ref 0.2–1)
BUN SERPL-MCNC: 12 MG/DL (ref 7–18)
BUN/CREAT SERPL: 16 (ref 12–20)
CALCIUM SERPL-MCNC: 9.5 MG/DL (ref 8.5–10.1)
CHLORIDE SERPL-SCNC: 105 MMOL/L (ref 100–111)
CO2 SERPL-SCNC: 27 MMOL/L (ref 21–32)
CREAT SERPL-MCNC: 0.75 MG/DL (ref 0.6–1.3)
GLOBULIN SER CALC-MCNC: 3.5 G/DL (ref 2–4)
GLUCOSE SERPL-MCNC: 97 MG/DL (ref 74–99)
POTASSIUM SERPL-SCNC: 4.1 MMOL/L (ref 3.5–5.5)
PROT SERPL-MCNC: 7.2 G/DL (ref 6.4–8.2)
SODIUM SERPL-SCNC: 135 MMOL/L (ref 136–145)

## 2024-05-09 PROCEDURE — 3079F DIAST BP 80-89 MM HG: CPT | Performed by: FAMILY MEDICINE

## 2024-05-09 PROCEDURE — 99214 OFFICE O/P EST MOD 30 MIN: CPT | Performed by: FAMILY MEDICINE

## 2024-05-09 PROCEDURE — 80053 COMPREHEN METABOLIC PANEL: CPT

## 2024-05-09 PROCEDURE — 3075F SYST BP GE 130 - 139MM HG: CPT | Performed by: FAMILY MEDICINE

## 2024-05-09 PROCEDURE — 82306 VITAMIN D 25 HYDROXY: CPT

## 2024-05-09 PROCEDURE — 36415 COLL VENOUS BLD VENIPUNCTURE: CPT

## 2024-05-09 PROCEDURE — 1123F ACP DISCUSS/DSCN MKR DOCD: CPT | Performed by: FAMILY MEDICINE

## 2024-05-09 SDOH — ECONOMIC STABILITY: FOOD INSECURITY: WITHIN THE PAST 12 MONTHS, THE FOOD YOU BOUGHT JUST DIDN'T LAST AND YOU DIDN'T HAVE MONEY TO GET MORE.: NEVER TRUE

## 2024-05-09 SDOH — ECONOMIC STABILITY: INCOME INSECURITY: HOW HARD IS IT FOR YOU TO PAY FOR THE VERY BASICS LIKE FOOD, HOUSING, MEDICAL CARE, AND HEATING?: SOMEWHAT HARD

## 2024-05-09 SDOH — ECONOMIC STABILITY: FOOD INSECURITY: WITHIN THE PAST 12 MONTHS, YOU WORRIED THAT YOUR FOOD WOULD RUN OUT BEFORE YOU GOT MONEY TO BUY MORE.: NEVER TRUE

## 2024-05-09 ASSESSMENT — PATIENT HEALTH QUESTIONNAIRE - PHQ9
SUM OF ALL RESPONSES TO PHQ9 QUESTIONS 1 & 2: 0
SUM OF ALL RESPONSES TO PHQ QUESTIONS 1-9: 0
2. FEELING DOWN, DEPRESSED OR HOPELESS: NOT AT ALL
1. LITTLE INTEREST OR PLEASURE IN DOING THINGS: NOT AT ALL
SUM OF ALL RESPONSES TO PHQ QUESTIONS 1-9: 0

## 2024-05-09 NOTE — PROGRESS NOTES
SUBJECTIVE  Ff-up    HTN- taking enalapril and norvasc.    HL- reviewed cv risk factors, she declines statin , given her age benefit ratio data is limited, we agreed on no longer checking values    Osteoporosis- She denies fractures. She takes ca/vit D and not interested in treatment, she has been exercising regularly       ROS:  See HPI, all others negative        There is no problem list on file for this patient.    Current Outpatient Medications:     enalapril (VASOTEC) 20 MG tablet, TAKE 1 TABLET DAILY, Disp: 90 tablet, Rfl: 1    amLODIPine (NORVASC) 5 MG tablet, Take 1 tablet by mouth daily, Disp: 90 tablet, Rfl: 3    Multiple Vitamin (MULTIVITAMIN ADULT) TABS, Take 1 tablet by mouth daily, Disp: , Rfl:     vitamin D3 (CHOLECALCIFEROL) 125 MCG (5000 UT) TABS tablet, Take 1 tablet by mouth daily, Disp: , Rfl:     dorzolamide-timolol (COSOPT) 22.3-6.8 MG/ML ophthalmic solution, INSTILL 1 DROP TWICE DAILY INTO LEFT EYE ONLY, Disp: , Rfl:       No Known Allergies    Past Medical History:   Diagnosis Date    High blood pressure     High cholesterol     Migraine        Social History     Socioeconomic History    Marital status:      Spouse name: Not on file    Number of children: Not on file    Years of education: Not on file    Highest education level: Not on file   Occupational History    Not on file   Social Needs    Financial resource strain: Not on file    Food insecurity:     Worry: Not on file     Inability: Not on file    Transportation needs:     Medical: Not on file     Non-medical: Not on file   Tobacco Use    Smoking status: Former Smoker     Types: Cigarettes     Last attempt to quit: 1979     Years since quittin.7    Smokeless tobacco: Never Used   Substance and Sexual Activity    Alcohol use: Yes     Frequency: Monthly or less     Drinks per session: 1 or 2     Binge frequency: Never    Drug use: Never    Sexual activity: Not Currently     Partners: Male     Birth

## 2024-06-13 ENCOUNTER — OFFICE VISIT (OUTPATIENT)
Facility: CLINIC | Age: 84
End: 2024-06-13

## 2024-06-13 VITALS
DIASTOLIC BLOOD PRESSURE: 72 MMHG | WEIGHT: 174 LBS | SYSTOLIC BLOOD PRESSURE: 138 MMHG | HEIGHT: 65 IN | RESPIRATION RATE: 16 BRPM | OXYGEN SATURATION: 97 % | BODY MASS INDEX: 28.99 KG/M2 | TEMPERATURE: 97.1 F | HEART RATE: 77 BPM

## 2024-06-13 DIAGNOSIS — Z00.00 MEDICARE ANNUAL WELLNESS VISIT, SUBSEQUENT: Primary | ICD-10-CM

## 2024-06-13 DIAGNOSIS — I10 ESSENTIAL HYPERTENSION: ICD-10-CM

## 2024-06-13 DIAGNOSIS — E78.00 PURE HYPERCHOLESTEROLEMIA: ICD-10-CM

## 2024-06-13 DIAGNOSIS — M81.0 OSTEOPOROSIS WITHOUT CURRENT PATHOLOGICAL FRACTURE, UNSPECIFIED OSTEOPOROSIS TYPE: ICD-10-CM

## 2024-06-13 DIAGNOSIS — E55.9 VITAMIN D DEFICIENCY: ICD-10-CM

## 2024-06-13 RX ORDER — ENALAPRIL MALEATE 20 MG/1
TABLET ORAL
Qty: 90 TABLET | Refills: 1 | Status: SHIPPED | OUTPATIENT
Start: 2024-06-13

## 2024-06-13 RX ORDER — AMLODIPINE BESYLATE 5 MG/1
5 TABLET ORAL DAILY
Qty: 90 TABLET | Refills: 3 | Status: SHIPPED | OUTPATIENT
Start: 2024-06-13

## 2024-06-13 ASSESSMENT — PATIENT HEALTH QUESTIONNAIRE - PHQ9
SUM OF ALL RESPONSES TO PHQ QUESTIONS 1-9: 0
SUM OF ALL RESPONSES TO PHQ9 QUESTIONS 1 & 2: 0
SUM OF ALL RESPONSES TO PHQ QUESTIONS 1-9: 0
1. LITTLE INTEREST OR PLEASURE IN DOING THINGS: NOT AT ALL
SUM OF ALL RESPONSES TO PHQ QUESTIONS 1-9: 0
2. FEELING DOWN, DEPRESSED OR HOPELESS: NOT AT ALL
SUM OF ALL RESPONSES TO PHQ QUESTIONS 1-9: 0

## 2024-06-13 NOTE — PROGRESS NOTES
Medicare Annual Wellness Visit    Sierra Mckeon is here for Medicare AWV, Hypertension (FORGOT TO TAKE HER MEDICATIONS THIS AM), and Cholesterol Problem    Assessment & Plan   Medicare annual wellness visit, subsequent  Vitamin D deficiency  -     Vitamin D 25 Hydroxy; Future  Essential hypertension  -     Basic Metabolic Panel; Future  Recommendations for Preventive Services Due: see orders and patient instructions/AVS.  Recommended screening schedule for the next 5-10 years is provided to the patient in written form: see Patient Instructions/AVS.     Return in about 6 months (around 12/13/2024), or if symptoms worsen or fail to improve, for non fasting labs a week prior to your next visit, routine chronic illness care.     Subjective       Patient's complete Health Risk Assessment and screening values have been reviewed and are found in Flowsheets. The following problems were reviewed today and where indicated follow up appointments were made and/or referrals ordered.    Positive Risk Factor Screenings with Interventions:                      Advanced Directives:  Do you have a Living Will?: (!) No    Intervention:  has NO advanced directive - information provided                     Objective   Vitals:    06/13/24 1437   BP: 138/72   Site: Left Upper Arm   Position: Sitting   Cuff Size: Medium Adult   Pulse: 77   Resp: 16   Temp: 97.1 °F (36.2 °C)   TempSrc: Temporal   SpO2: 97%   Weight: 78.9 kg (174 lb)   Height: 1.638 m (5' 4.5\")      Body mass index is 29.41 kg/m².               No Known Allergies  Prior to Visit Medications    Medication Sig Taking? Authorizing Provider   amLODIPine (NORVASC) 5 MG tablet Take 1 tablet by mouth daily Yes Nancy Babcock MD   enalapril (VASOTEC) 20 MG tablet TAKE 1 TABLET DAILY Yes Nancy Babcock MD   Multiple Vitamin (MULTIVITAMIN ADULT) TABS Take 1 tablet by mouth daily Yes Provider, MD Emi   vitamin D3 (CHOLECALCIFEROL) 125 MCG (5000 UT) TABS tablet Take 1

## 2025-03-27 RX ORDER — ENALAPRIL MALEATE 20 MG/1
20 TABLET ORAL DAILY
Qty: 30 TABLET | Refills: 0 | Status: SHIPPED | OUTPATIENT
Start: 2025-03-27

## 2025-06-04 ENCOUNTER — TELEPHONE (OUTPATIENT)
Dept: PHARMACY | Facility: CLINIC | Age: 85
End: 2025-06-04

## 2025-06-04 NOTE — TELEPHONE ENCOUNTER
[facility] [place] [facility]: ADHERENCE REVIEW  Identified care gap per Mangham: fills at [facility]: ACE/ARB adherence    ASSESSMENT    ACE/ARB ADHERENCE    Insurance Records claims through 5/26/25 (Prior Year PDC = not reported; YTD PDC = FIRST FILL; Potential Fail Date: 6/21/25):   Enalapril 20 mg tablets last filled on 3/27/25 for 30 day supply. Next refill due: 4/26/25  No refills remaining    Note refill sent 3/27/25 \"Overdue for follow-up please schedule soon given 30-day \"    BP Readings from Last 3 Encounters:   06/13/24 138/72   05/09/24 136/84   10/24/23 136/84     CrCl cannot be calculated (Patient's most recent lab result is older than the maximum 180 days allowed.).  Lab Results   Component Value Date    CREATININE 0.75 05/09/2024     Lab Results   Component Value Date    K 4.1 05/09/2024       PLAN  LIS- YES    The following are interventions that have been identified:   Patient OVERDUE refilling enalapril and active on home medication list.   Patient NEEDS REFILLS for enalapril and is past due for visit    Reached patient to review. She states she has some enalapril remaining. She states she is following with a new PCP, Dr. Cobb with [facility]. She plans to ask for medication refills at that appointment.    Thanked her for her time today.    No further outreach planned by PharmD at this time.    Last Visit: 6/13/24  No future appointments.    [phone]    Debora Rojas PharmD, [facility], [facility]  [facility] Clinical Pharmacy  Department, toll free: [phone], option 1    For Pharmacy Admin Tracking Only    Program: [employer]  CPA in place:  No  Gap Closed?: No   Time Spent (min): 15